# Patient Record
Sex: FEMALE | Race: WHITE | NOT HISPANIC OR LATINO | Employment: UNEMPLOYED | URBAN - METROPOLITAN AREA
[De-identification: names, ages, dates, MRNs, and addresses within clinical notes are randomized per-mention and may not be internally consistent; named-entity substitution may affect disease eponyms.]

---

## 2017-04-18 ENCOUNTER — TRANSCRIBE ORDERS (OUTPATIENT)
Dept: LAB | Facility: HOSPITAL | Age: 42
End: 2017-04-18

## 2017-04-18 ENCOUNTER — APPOINTMENT (OUTPATIENT)
Dept: LAB | Facility: HOSPITAL | Age: 42
End: 2017-04-18

## 2017-04-18 DIAGNOSIS — L57.8 NODULAR ELASTOSIS WITH CYSTS AND COMEDONES OF FAVRE AND RACOUCHOT: Primary | ICD-10-CM

## 2017-04-18 DIAGNOSIS — L70.0 NODULAR ELASTOSIS WITH CYSTS AND COMEDONES OF FAVRE AND RACOUCHOT: Primary | ICD-10-CM

## 2017-04-18 LAB
ALBUMIN SERPL-MCNC: 4.4 G/DL (ref 3.2–4.8)
ALBUMIN/GLOB SERPL: 1.4 G/DL (ref 1.5–2.5)
ALP SERPL-CCNC: 48 U/L (ref 25–100)
ALT SERPL W P-5'-P-CCNC: 17 U/L (ref 7–40)
ANION GAP SERPL CALCULATED.3IONS-SCNC: 12 MMOL/L (ref 3–11)
AST SERPL-CCNC: 26 U/L (ref 0–33)
BILIRUB SERPL-MCNC: 1 MG/DL (ref 0.3–1.2)
BUN BLD-MCNC: 13 MG/DL (ref 9–23)
BUN/CREAT SERPL: 14.4 (ref 7–25)
CALCIUM SPEC-SCNC: 9.7 MG/DL (ref 8.7–10.4)
CHLORIDE SERPL-SCNC: 104 MMOL/L (ref 99–109)
CO2 SERPL-SCNC: 23 MMOL/L (ref 20–31)
CREAT BLD-MCNC: 0.9 MG/DL (ref 0.6–1.3)
GFR SERPL CREATININE-BSD FRML MDRD: 69 ML/MIN/1.73
GLOBULIN UR ELPH-MCNC: 3.2 GM/DL
GLUCOSE BLD-MCNC: 82 MG/DL (ref 70–100)
HCG INTACT+B SERPL-ACNC: <5 MIU/ML
POTASSIUM BLD-SCNC: 4.1 MMOL/L (ref 3.5–5.5)
PROT SERPL-MCNC: 7.6 G/DL (ref 5.7–8.2)
SODIUM BLD-SCNC: 139 MMOL/L (ref 132–146)

## 2017-04-18 PROCEDURE — 84702 CHORIONIC GONADOTROPIN TEST: CPT | Performed by: PHYSICIAN ASSISTANT

## 2017-04-18 PROCEDURE — 80053 COMPREHEN METABOLIC PANEL: CPT | Performed by: PHYSICIAN ASSISTANT

## 2017-04-18 PROCEDURE — 36415 COLL VENOUS BLD VENIPUNCTURE: CPT | Performed by: PHYSICIAN ASSISTANT

## 2017-05-16 ENCOUNTER — TRANSCRIBE ORDERS (OUTPATIENT)
Dept: LAB | Facility: HOSPITAL | Age: 42
End: 2017-05-16

## 2017-05-16 ENCOUNTER — APPOINTMENT (OUTPATIENT)
Dept: LAB | Facility: HOSPITAL | Age: 42
End: 2017-05-16

## 2017-05-16 DIAGNOSIS — Z79.899 POLYPHARMACY: Primary | ICD-10-CM

## 2017-05-16 LAB — POTASSIUM BLD-SCNC: 4.2 MMOL/L (ref 3.5–5.5)

## 2017-05-16 PROCEDURE — 84132 ASSAY OF SERUM POTASSIUM: CPT | Performed by: PHYSICIAN ASSISTANT

## 2017-05-16 PROCEDURE — 36415 COLL VENOUS BLD VENIPUNCTURE: CPT | Performed by: PHYSICIAN ASSISTANT

## 2017-09-14 ENCOUNTER — APPOINTMENT (OUTPATIENT)
Dept: LAB | Facility: HOSPITAL | Age: 42
End: 2017-09-14

## 2017-09-14 ENCOUNTER — TRANSCRIBE ORDERS (OUTPATIENT)
Dept: LAB | Facility: HOSPITAL | Age: 42
End: 2017-09-14

## 2017-09-14 DIAGNOSIS — Z79.899 POLYPHARMACY: Primary | ICD-10-CM

## 2017-09-14 LAB
ALBUMIN SERPL-MCNC: 4.1 G/DL (ref 3.2–4.8)
ALBUMIN/GLOB SERPL: 1.3 G/DL (ref 1.5–2.5)
ALP SERPL-CCNC: 53 U/L (ref 25–100)
ALT SERPL W P-5'-P-CCNC: 19 U/L (ref 7–40)
ANION GAP SERPL CALCULATED.3IONS-SCNC: 4 MMOL/L (ref 3–11)
AST SERPL-CCNC: 25 U/L (ref 0–33)
BILIRUB SERPL-MCNC: 2.2 MG/DL (ref 0.3–1.2)
BUN BLD-MCNC: 11 MG/DL (ref 9–23)
BUN/CREAT SERPL: 12.2 (ref 7–25)
CALCIUM SPEC-SCNC: 8.9 MG/DL (ref 8.7–10.4)
CHLORIDE SERPL-SCNC: 104 MMOL/L (ref 99–109)
CO2 SERPL-SCNC: 30 MMOL/L (ref 20–31)
CREAT BLD-MCNC: 0.9 MG/DL (ref 0.6–1.3)
GFR SERPL CREATININE-BSD FRML MDRD: 69 ML/MIN/1.73
GLOBULIN UR ELPH-MCNC: 3.2 GM/DL
GLUCOSE BLD-MCNC: 132 MG/DL (ref 70–100)
HCG INTACT+B SERPL-ACNC: <5 MIU/ML
POTASSIUM BLD-SCNC: 4.1 MMOL/L (ref 3.5–5.5)
PROT SERPL-MCNC: 7.3 G/DL (ref 5.7–8.2)
SODIUM BLD-SCNC: 138 MMOL/L (ref 132–146)

## 2017-09-14 PROCEDURE — 80053 COMPREHEN METABOLIC PANEL: CPT | Performed by: PHYSICIAN ASSISTANT

## 2017-09-14 PROCEDURE — 84702 CHORIONIC GONADOTROPIN TEST: CPT | Performed by: PHYSICIAN ASSISTANT

## 2017-09-14 PROCEDURE — 36415 COLL VENOUS BLD VENIPUNCTURE: CPT | Performed by: PHYSICIAN ASSISTANT

## 2017-10-24 ENCOUNTER — TRANSCRIBE ORDERS (OUTPATIENT)
Dept: ADMINISTRATIVE | Facility: HOSPITAL | Age: 42
End: 2017-10-24

## 2017-10-24 DIAGNOSIS — Z12.31 VISIT FOR SCREENING MAMMOGRAM: Primary | ICD-10-CM

## 2017-11-07 ENCOUNTER — TRANSCRIBE ORDERS (OUTPATIENT)
Dept: LAB | Facility: HOSPITAL | Age: 42
End: 2017-11-07

## 2017-11-07 ENCOUNTER — LAB (OUTPATIENT)
Dept: LAB | Facility: HOSPITAL | Age: 42
End: 2017-11-07

## 2017-11-07 DIAGNOSIS — Z79.899 POLYPHARMACY: Primary | ICD-10-CM

## 2017-11-07 DIAGNOSIS — Z79.899 POLYPHARMACY: ICD-10-CM

## 2017-11-07 LAB — POTASSIUM BLD-SCNC: 4.2 MMOL/L (ref 3.5–5.5)

## 2017-11-07 PROCEDURE — 36415 COLL VENOUS BLD VENIPUNCTURE: CPT

## 2017-11-07 PROCEDURE — 84132 ASSAY OF SERUM POTASSIUM: CPT | Performed by: PHYSICIAN ASSISTANT

## 2017-11-28 ENCOUNTER — HOSPITAL ENCOUNTER (OUTPATIENT)
Dept: MAMMOGRAPHY | Facility: HOSPITAL | Age: 42
Discharge: HOME | End: 2017-11-28
Attending: OBSTETRICS & GYNECOLOGY | Admitting: OBSTETRICS & GYNECOLOGY

## 2017-11-28 DIAGNOSIS — Z12.31 VISIT FOR SCREENING MAMMOGRAM: ICD-10-CM

## 2017-11-28 PROCEDURE — G0202 SCR MAMMO BI INCL CAD: HCPCS

## 2017-11-28 PROCEDURE — 77063 BREAST TOMOSYNTHESIS BI: CPT

## 2017-11-29 PROCEDURE — 77067 SCR MAMMO BI INCL CAD: CPT | Performed by: RADIOLOGY

## 2017-11-29 PROCEDURE — 77063 BREAST TOMOSYNTHESIS BI: CPT | Performed by: RADIOLOGY

## 2018-05-17 ENCOUNTER — LAB (OUTPATIENT)
Dept: LAB | Facility: HOSPITAL | Age: 43
End: 2018-05-17

## 2018-05-17 ENCOUNTER — TRANSCRIBE ORDERS (OUTPATIENT)
Dept: LAB | Facility: HOSPITAL | Age: 43
End: 2018-05-17

## 2018-05-17 DIAGNOSIS — D48.5 NEOPLASM OF UNCERTAIN BEHAVIOR OF SKIN: ICD-10-CM

## 2018-05-17 DIAGNOSIS — L70.0 NODULAR ELASTOSIS WITH CYSTS AND COMEDONES OF FAVRE AND RACOUCHOT: ICD-10-CM

## 2018-05-17 DIAGNOSIS — D48.5 NEOPLASM OF UNCERTAIN BEHAVIOR OF SKIN: Primary | ICD-10-CM

## 2018-05-17 DIAGNOSIS — L57.8 NODULAR ELASTOSIS WITH CYSTS AND COMEDONES OF FAVRE AND RACOUCHOT: ICD-10-CM

## 2018-05-17 LAB
ALBUMIN SERPL-MCNC: 4.3 G/DL (ref 3.2–4.8)
ALBUMIN/GLOB SERPL: 1.5 G/DL (ref 1.5–2.5)
ALP SERPL-CCNC: 50 U/L (ref 25–100)
ALT SERPL W P-5'-P-CCNC: 21 U/L (ref 7–40)
ANION GAP SERPL CALCULATED.3IONS-SCNC: 7 MMOL/L (ref 3–11)
AST SERPL-CCNC: 25 U/L (ref 0–33)
BILIRUB SERPL-MCNC: 2 MG/DL (ref 0.3–1.2)
BUN BLD-MCNC: 13 MG/DL (ref 9–23)
BUN/CREAT SERPL: 14.4 (ref 7–25)
CALCIUM SPEC-SCNC: 9.1 MG/DL (ref 8.7–10.4)
CHLORIDE SERPL-SCNC: 103 MMOL/L (ref 99–109)
CO2 SERPL-SCNC: 31 MMOL/L (ref 20–31)
CREAT BLD-MCNC: 0.9 MG/DL (ref 0.6–1.3)
GFR SERPL CREATININE-BSD FRML MDRD: 69 ML/MIN/1.73
GLOBULIN UR ELPH-MCNC: 2.9 GM/DL
GLUCOSE BLD-MCNC: 80 MG/DL (ref 70–100)
POTASSIUM BLD-SCNC: 4.4 MMOL/L (ref 3.5–5.5)
PROT SERPL-MCNC: 7.2 G/DL (ref 5.7–8.2)
SODIUM BLD-SCNC: 141 MMOL/L (ref 132–146)

## 2018-05-17 PROCEDURE — 80053 COMPREHEN METABOLIC PANEL: CPT

## 2018-05-17 PROCEDURE — 36415 COLL VENOUS BLD VENIPUNCTURE: CPT

## 2018-09-20 RX ORDER — FLUOXETINE 10 MG/1
CAPSULE ORAL
Qty: 45 CAPSULE | Refills: 1 | Status: SHIPPED | OUTPATIENT
Start: 2018-09-20 | End: 2018-11-18 | Stop reason: SDUPTHER

## 2018-11-01 ENCOUNTER — OFFICE VISIT (OUTPATIENT)
Dept: INTERNAL MEDICINE | Facility: CLINIC | Age: 43
End: 2018-11-01

## 2018-11-01 VITALS
DIASTOLIC BLOOD PRESSURE: 72 MMHG | BODY MASS INDEX: 23.56 KG/M2 | SYSTOLIC BLOOD PRESSURE: 116 MMHG | HEIGHT: 64 IN | TEMPERATURE: 100.2 F | HEART RATE: 84 BPM | RESPIRATION RATE: 16 BRPM | WEIGHT: 138 LBS

## 2018-11-01 DIAGNOSIS — Z00.00 PHYSICAL EXAM: Primary | ICD-10-CM

## 2018-11-01 LAB
ALBUMIN SERPL-MCNC: 4.68 G/DL (ref 3.2–4.8)
ALBUMIN/GLOB SERPL: 1.7 G/DL (ref 1.5–2.5)
ALP SERPL-CCNC: 50 U/L (ref 25–100)
ALT SERPL W P-5'-P-CCNC: 19 U/L (ref 7–40)
ANION GAP SERPL CALCULATED.3IONS-SCNC: 5 MMOL/L (ref 3–11)
ARTICHOKE IGE QN: 121 MG/DL (ref 0–130)
AST SERPL-CCNC: 25 U/L (ref 0–33)
BASOPHILS # BLD AUTO: 0.03 10*3/MM3 (ref 0–0.2)
BASOPHILS NFR BLD AUTO: 0.4 % (ref 0–1)
BILIRUB BLD-MCNC: NEGATIVE MG/DL
BILIRUB SERPL-MCNC: 1.3 MG/DL (ref 0.3–1.2)
BUN BLD-MCNC: 11 MG/DL (ref 9–23)
BUN/CREAT SERPL: 11.5 (ref 7–25)
CALCIUM SPEC-SCNC: 9.8 MG/DL (ref 8.7–10.4)
CHLORIDE SERPL-SCNC: 106 MMOL/L (ref 99–109)
CHOLEST SERPL-MCNC: 202 MG/DL (ref 0–200)
CLARITY, POC: CLEAR
CO2 SERPL-SCNC: 29 MMOL/L (ref 20–31)
COLOR UR: YELLOW
CREAT BLD-MCNC: 0.96 MG/DL (ref 0.6–1.3)
DEPRECATED RDW RBC AUTO: 40.9 FL (ref 37–54)
EOSINOPHIL # BLD AUTO: 0.14 10*3/MM3 (ref 0–0.3)
EOSINOPHIL NFR BLD AUTO: 2 % (ref 0–3)
ERYTHROCYTE [DISTWIDTH] IN BLOOD BY AUTOMATED COUNT: 12 % (ref 11.3–14.5)
EXPIRATION DATE: ABNORMAL
GFR SERPL CREATININE-BSD FRML MDRD: 63 ML/MIN/1.73
GLOBULIN UR ELPH-MCNC: 2.7 GM/DL
GLUCOSE BLD-MCNC: 85 MG/DL (ref 70–100)
GLUCOSE UR STRIP-MCNC: NEGATIVE MG/DL
HCT VFR BLD AUTO: 41 % (ref 34.5–44)
HDLC SERPL-MCNC: 73 MG/DL (ref 40–60)
HGB BLD-MCNC: 13.8 G/DL (ref 11.5–15.5)
IMM GRANULOCYTES # BLD: 0.01 10*3/MM3 (ref 0–0.03)
IMM GRANULOCYTES NFR BLD: 0.1 % (ref 0–0.6)
KETONES UR QL: NEGATIVE
LEUKOCYTE EST, POC: NEGATIVE
LYMPHOCYTES # BLD AUTO: 2.01 10*3/MM3 (ref 0.6–4.8)
LYMPHOCYTES NFR BLD AUTO: 28.1 % (ref 24–44)
Lab: ABNORMAL
MCH RBC QN AUTO: 30.9 PG (ref 27–31)
MCHC RBC AUTO-ENTMCNC: 33.7 G/DL (ref 32–36)
MCV RBC AUTO: 91.9 FL (ref 80–99)
MONOCYTES # BLD AUTO: 0.91 10*3/MM3 (ref 0–1)
MONOCYTES NFR BLD AUTO: 12.7 % (ref 0–12)
NEUTROPHILS # BLD AUTO: 4.06 10*3/MM3 (ref 1.5–8.3)
NEUTROPHILS NFR BLD AUTO: 56.7 % (ref 41–71)
NITRITE UR-MCNC: NEGATIVE MG/ML
PH UR: 7 [PH] (ref 5–8)
PLATELET # BLD AUTO: 249 10*3/MM3 (ref 150–450)
PMV BLD AUTO: 11.1 FL (ref 6–12)
POTASSIUM BLD-SCNC: 4.2 MMOL/L (ref 3.5–5.5)
PROT SERPL-MCNC: 7.4 G/DL (ref 5.7–8.2)
PROT UR STRIP-MCNC: NEGATIVE MG/DL
RBC # BLD AUTO: 4.46 10*6/MM3 (ref 3.89–5.14)
RBC # UR STRIP: NEGATIVE /UL
SODIUM BLD-SCNC: 140 MMOL/L (ref 132–146)
SP GR UR: 1 (ref 1–1.03)
TRIGL SERPL-MCNC: 98 MG/DL (ref 0–150)
TSH SERPL DL<=0.05 MIU/L-ACNC: 1.59 MIU/ML (ref 0.35–5.35)
UROBILINOGEN UR QL: NORMAL
WBC NRBC COR # BLD: 7.16 10*3/MM3 (ref 3.5–10.8)

## 2018-11-01 PROCEDURE — 85025 COMPLETE CBC W/AUTO DIFF WBC: CPT | Performed by: INTERNAL MEDICINE

## 2018-11-01 PROCEDURE — 80061 LIPID PANEL: CPT | Performed by: INTERNAL MEDICINE

## 2018-11-01 PROCEDURE — 81003 URINALYSIS AUTO W/O SCOPE: CPT | Performed by: INTERNAL MEDICINE

## 2018-11-01 PROCEDURE — 80053 COMPREHEN METABOLIC PANEL: CPT | Performed by: INTERNAL MEDICINE

## 2018-11-01 PROCEDURE — 99386 PREV VISIT NEW AGE 40-64: CPT | Performed by: INTERNAL MEDICINE

## 2018-11-01 PROCEDURE — 84443 ASSAY THYROID STIM HORMONE: CPT | Performed by: INTERNAL MEDICINE

## 2018-11-01 PROCEDURE — 36415 COLL VENOUS BLD VENIPUNCTURE: CPT | Performed by: INTERNAL MEDICINE

## 2018-11-01 RX ORDER — ZOLPIDEM TARTRATE 5 MG/1
5 TABLET ORAL NIGHTLY PRN
COMMUNITY
End: 2019-04-16 | Stop reason: SDUPTHER

## 2018-11-01 RX ORDER — SPIRONOLACTONE 25 MG/1
75 TABLET ORAL DAILY
Refills: 5 | COMMUNITY
Start: 2018-10-17

## 2018-11-01 RX ORDER — VALACYCLOVIR HYDROCHLORIDE 1 G/1
1000 TABLET, FILM COATED ORAL 2 TIMES DAILY PRN
COMMUNITY
End: 2022-11-29 | Stop reason: SDUPTHER

## 2018-11-01 RX ORDER — CYCLOBENZAPRINE HCL 10 MG
10 TABLET ORAL EVERY 8 HOURS PRN
Refills: 2 | COMMUNITY
Start: 2018-10-17

## 2018-11-01 RX ORDER — MAGNESIUM GLUCONATE 27 MG(500)
500 TABLET ORAL DAILY
COMMUNITY

## 2018-11-01 NOTE — PROGRESS NOTES
Chief Complaint   Patient presents with   • NEW PATIENT, ESTABLISH CARE       History of Present Illness      The patient presents for an established patient physical examination and health maintenance visit.    Review of Systems    GENERAL/CONSTITUTIONAL- Denies Unexplained Weight Loss, Fever, Chills, Sweats, Fatigue, Weakness or Malaise.    HEENT- Denies Eye Pain, Eye Drainage, Nasal Discharge, Sore Throat, Ear Pain, Ear Drainage, Headache, Decreased Vision, Sinus Pain, Nasal Congestion, Decreased Hearing, Visual Disturbance, Diplopia or Tinnitus.    NECK- Denies Decreased Range of Motion, Stiffness, Thyroid Nodules, Enlarged Lymph Nodes or Goiter.    CARDIOVASCULAR- Denies Chest Pain, Claudication, Edema, Syncope or Palpitations.    PULMONARY- Denies Wheezing, Dyspnea, Sputum Production, Cough, Hemoptysis or Pleuritic Chest Pain.    GASTROINTESTINAL- Denies Abdominal Pain, Nausea, Vomiting, Diarrhea, Blood per Rectum, Constipation or Heartburn.    GENITOURINARY- Denies Dysuria, Hematuria, Urinary Frequency, Urinary Leakage, Pelvic Pain, Vaginal Prolapse, Vaginal Discharge, Dyspareunia or Abnormal Menses.    ENDOCRINE- Denies Cold Intolerance, Depression, Hair Loss, Heat Intolerance, Memory Loss, Polydypsia, Polyphagia, Polyuria, Sleep Disturbance or Weight Gain.    NEUROLOGIC- Denies Seizures, Visual Changes, Confusion or Excessive Daytime Sleepiness.    MUSCULOSKELETAL- Denies Joint Pain, Joint Stiffness, Decreased Range of Motion, Joint Swelling or Erythema of Joints.    INTEGUMENTARY- Denies Rash, Lumps, Sores, Itching, Dryness, Color Change, Changes in Hair, Brittle Nails, Discolored Nails, Thickened Nails, Growths or Alopecia.    Medications      Current Outpatient Prescriptions:   •  Cholecalciferol (VITAMIN D3) 1000 units capsule, Take 1,000 Units by mouth Daily., Disp: , Rfl:   •  cyclobenzaprine (FLEXERIL) 10 MG tablet, Take 10 mg by mouth Every 8 (Eight) Hours As Needed for Muscle Spasms., Disp: , Rfl:  "2  •  D-Mannose 500 MG capsule, Take 1 tablet by mouth Daily., Disp: , Rfl:   •  FLUoxetine (PROzac) 10 MG capsule, TAKE 1 CAPSULE ONCE DAILY FOR 14 DAYS AND 2 CAPSULES ON PREMENSES (Patient taking differently: TAKE 1 CAPSULE ONCE DAILY), Disp: 45 capsule, Rfl: 1  •  Folic Acid-Vit B6-Vit B12 (FA-VITAMIN B-6-VITAMIN B-12 PO), Take 1 tablet by mouth Daily., Disp: , Rfl:   •  magnesium gluconate (MAGONATE) 500 MG tablet, Take 500 mg by mouth Daily., Disp: , Rfl:   •  spironolactone (ALDACTONE) 25 MG tablet, Take 75 mg by mouth Daily., Disp: , Rfl: 5  •  valACYclovir (VALTREX) 1000 MG tablet, Take 1,000 mg by mouth 2 (Two) Times a Day As Needed., Disp: , Rfl:   •  zolpidem (AMBIEN) 5 MG tablet, Take 5 mg by mouth At Night As Needed for Sleep., Disp: , Rfl:      Allergies    Allergies   Allergen Reactions   • Penicillins Rash       Problem List    There is no problem list on file for this patient.      Medications, Allergies, Problems List and Past History were reviewed and updated.    Physical Examination    /72 (BP Location: Right arm, Patient Position: Sitting, Cuff Size: Adult)   Pulse 84   Temp 100.2 °F (37.9 °C) (Temporal Artery )   Resp 16   Ht 162.6 cm (64\")   Wt 62.6 kg (138 lb)   LMP  (LMP Unknown)   Breastfeeding? No   BMI 23.69 kg/m²     HEENT: Head- Normocephalic Atraumatic. Facies- Within normal limits. Pinnas- Normal texture and shape bilaterally. Canals- Normal bilaterally. TMs- Normal bilaterally. Nares- Patent bilaterally. Nasal Septum- is normal. There is no tenderness to palpation over the frontal or maxillary sinuses. Lids- Normal bilaterally. Conjunctiva- Clear bilaterally. Sclera- Anicteric bilaterally. Oropharynx- Moist with no lesions. Tonsils- No enlargement, erythema or exudate.    Neck: Thyroid- non enlarged, symmetric and has no nodules. No bruits are detected. ROM- Normal Range of Motion with no rigidity.    Lymph Nodes: Cervical- no enlarged lymph nodes noted. Clavicular- " Deferred. Axillary- Deferred. Inguinal- Deferred.    Lungs: Auscultation- Clear to auscultation bilaterally. There are no retractions, clubbing or cyanosis. The Expiratory to Inspiratory ratio is equal.    Cardiovascular: There are no carotid bruits. Heart- Normal Rate with Regular rhythm and no murmurs. There are no gallops. There are no rubs. In the lower extremities there is no edema. The upper extremities do not have edema.    Abdomen: Soft, benign, non-tender with no masses, hernias, organomegaly or scars.    Breast: Normal contours bilaterally with no masses, discharge, skin changes or lumps. There are no scars noted.    The patient has no worrisome or suspicious skin lesions noted.    Impression and Assessment    Normal Physical Examination.    Plan    Counseling was provided regarding: Adequate Aerobic Exercise, Dental Visits, Flossing Teeth and Heart Healthy Diet.    The following was ordered for screening and health maintenance: CBC w Automated Diff, CMP, Lipid Profile, TSH and U/A.       Immunizations Ordered and Administered: None.    Rosa Isela was seen today for new patient, establish care.    Diagnoses and all orders for this visit:    Physical exam  -     Comprehensive Metabolic Panel  -     Lipid Panel  -     CBC & Differential  -     TSH  -     POC Urinalysis Dipstick, Automated        Return to Office    The patient was instructed to return for follow-up in 1 year. Next Visit: Physical.    The patient was instructed to return sooner if the condition changes, worsens, or doesn't resolve.

## 2018-11-19 RX ORDER — FLUOXETINE 10 MG/1
CAPSULE ORAL
Qty: 45 CAPSULE | Refills: 5 | Status: SHIPPED | OUTPATIENT
Start: 2018-11-19 | End: 2018-12-17 | Stop reason: SDUPTHER

## 2018-12-17 RX ORDER — FLUOXETINE 10 MG/1
CAPSULE ORAL
Qty: 135 CAPSULE | Refills: 1 | Status: SHIPPED | OUTPATIENT
Start: 2018-12-17 | End: 2019-06-22 | Stop reason: SDUPTHER

## 2018-12-17 RX ORDER — FLUOXETINE 10 MG/1
CAPSULE ORAL
Qty: 135 CAPSULE | Refills: 1 | Status: SHIPPED | OUTPATIENT
Start: 2018-12-17 | End: 2018-12-17 | Stop reason: SDUPTHER

## 2019-04-11 ENCOUNTER — TRANSCRIBE ORDERS (OUTPATIENT)
Dept: ADMINISTRATIVE | Facility: HOSPITAL | Age: 44
End: 2019-04-11

## 2019-04-11 DIAGNOSIS — Z12.31 VISIT FOR SCREENING MAMMOGRAM: Primary | ICD-10-CM

## 2019-04-16 ENCOUNTER — TELEPHONE (OUTPATIENT)
Dept: INTERNAL MEDICINE | Facility: CLINIC | Age: 44
End: 2019-04-16

## 2019-04-16 RX ORDER — ZOLPIDEM TARTRATE 5 MG/1
5 TABLET ORAL NIGHTLY PRN
Qty: 30 TABLET | Refills: 2 | Status: SHIPPED | OUTPATIENT
Start: 2019-04-16 | End: 2019-11-12 | Stop reason: SDUPTHER

## 2019-04-16 NOTE — TELEPHONE ENCOUNTER
----- Message from Regina Fofana sent at 4/16/2019 11:41 AM EDT -----  [phone]  REFILL ON AMBIEN  5 MG   [facility] [address]

## 2019-06-04 ENCOUNTER — HOSPITAL ENCOUNTER (OUTPATIENT)
Dept: MAMMOGRAPHY | Facility: HOSPITAL | Age: 44
Discharge: HOME | End: 2019-06-04
Admitting: OBSTETRICS & GYNECOLOGY

## 2019-06-04 DIAGNOSIS — Z12.31 VISIT FOR SCREENING MAMMOGRAM: ICD-10-CM

## 2019-06-04 PROCEDURE — 77063 BREAST TOMOSYNTHESIS BI: CPT | Performed by: RADIOLOGY

## 2019-06-04 PROCEDURE — 77063 BREAST TOMOSYNTHESIS BI: CPT

## 2019-06-04 PROCEDURE — 77067 SCR MAMMO BI INCL CAD: CPT | Performed by: RADIOLOGY

## 2019-06-04 PROCEDURE — 77067 SCR MAMMO BI INCL CAD: CPT

## 2019-06-24 RX ORDER — FLUOXETINE 10 MG/1
CAPSULE ORAL
Qty: 135 CAPSULE | Refills: 1 | Status: SHIPPED | OUTPATIENT
Start: 2019-06-24 | End: 2020-02-11

## 2019-11-12 ENCOUNTER — OFFICE VISIT (OUTPATIENT)
Dept: INTERNAL MEDICINE | Facility: CLINIC | Age: 44
End: 2019-11-12

## 2019-11-12 VITALS
HEIGHT: 64 IN | BODY MASS INDEX: 23.73 KG/M2 | TEMPERATURE: 98 F | RESPIRATION RATE: 16 BRPM | WEIGHT: 139 LBS | HEART RATE: 76 BPM | DIASTOLIC BLOOD PRESSURE: 76 MMHG | SYSTOLIC BLOOD PRESSURE: 114 MMHG

## 2019-11-12 DIAGNOSIS — D50.9 IRON DEFICIENCY ANEMIA, UNSPECIFIED IRON DEFICIENCY ANEMIA TYPE: ICD-10-CM

## 2019-11-12 DIAGNOSIS — Z00.00 PHYSICAL EXAM: Primary | ICD-10-CM

## 2019-11-12 LAB
25(OH)D3 SERPL-MCNC: 38.2 NG/ML (ref 30–100)
ALBUMIN SERPL-MCNC: 4.7 G/DL (ref 3.5–5.2)
ALBUMIN/GLOB SERPL: 1.6 G/DL
ALP SERPL-CCNC: 51 U/L (ref 39–117)
ALT SERPL W P-5'-P-CCNC: 16 U/L (ref 1–33)
ANION GAP SERPL CALCULATED.3IONS-SCNC: 13.4 MMOL/L (ref 5–15)
AST SERPL-CCNC: 22 U/L (ref 1–32)
BASOPHILS # BLD AUTO: 0.05 10*3/MM3 (ref 0–0.2)
BASOPHILS NFR BLD AUTO: 0.9 % (ref 0–1.5)
BILIRUB BLD-MCNC: NEGATIVE MG/DL
BILIRUB SERPL-MCNC: 1.1 MG/DL (ref 0.2–1.2)
BUN BLD-MCNC: 11 MG/DL (ref 6–20)
BUN/CREAT SERPL: 11.8 (ref 7–25)
CALCIUM SPEC-SCNC: 9.5 MG/DL (ref 8.6–10.5)
CHLORIDE SERPL-SCNC: 99 MMOL/L (ref 98–107)
CHOLEST SERPL-MCNC: 188 MG/DL (ref 0–200)
CLARITY, POC: CLEAR
CO2 SERPL-SCNC: 27.6 MMOL/L (ref 22–29)
COLOR UR: YELLOW
CREAT BLD-MCNC: 0.93 MG/DL (ref 0.57–1)
DEPRECATED RDW RBC AUTO: 39.9 FL (ref 37–54)
EOSINOPHIL # BLD AUTO: 0.18 10*3/MM3 (ref 0–0.4)
EOSINOPHIL NFR BLD AUTO: 3.3 % (ref 0.3–6.2)
ERYTHROCYTE [DISTWIDTH] IN BLOOD BY AUTOMATED COUNT: 12 % (ref 12.3–15.4)
EXPIRATION DATE: NORMAL
FERRITIN SERPL-MCNC: 65.6 NG/ML (ref 13–150)
GFR SERPL CREATININE-BSD FRML MDRD: 65 ML/MIN/1.73
GLOBULIN UR ELPH-MCNC: 3 GM/DL
GLUCOSE BLD-MCNC: 88 MG/DL (ref 65–99)
GLUCOSE UR STRIP-MCNC: NEGATIVE MG/DL
HCT VFR BLD AUTO: 38.5 % (ref 34–46.6)
HDLC SERPL-MCNC: 66 MG/DL (ref 40–60)
HGB BLD-MCNC: 13.4 G/DL (ref 12–15.9)
IMM GRANULOCYTES # BLD AUTO: 0.02 10*3/MM3 (ref 0–0.05)
IMM GRANULOCYTES NFR BLD AUTO: 0.4 % (ref 0–0.5)
IRON 24H UR-MRATE: 97 MCG/DL (ref 37–145)
IRON SATN MFR SERPL: 22 % (ref 20–50)
KETONES UR QL: NEGATIVE
LDLC SERPL CALC-MCNC: 110 MG/DL (ref 0–100)
LDLC/HDLC SERPL: 1.66 {RATIO}
LEUKOCYTE EST, POC: NEGATIVE
LYMPHOCYTES # BLD AUTO: 1.65 10*3/MM3 (ref 0.7–3.1)
LYMPHOCYTES NFR BLD AUTO: 30.4 % (ref 19.6–45.3)
Lab: NORMAL
MCH RBC QN AUTO: 32 PG (ref 26.6–33)
MCHC RBC AUTO-ENTMCNC: 34.8 G/DL (ref 31.5–35.7)
MCV RBC AUTO: 91.9 FL (ref 79–97)
MONOCYTES # BLD AUTO: 0.71 10*3/MM3 (ref 0.1–0.9)
MONOCYTES NFR BLD AUTO: 13.1 % (ref 5–12)
NEUTROPHILS # BLD AUTO: 2.81 10*3/MM3 (ref 1.7–7)
NEUTROPHILS NFR BLD AUTO: 51.9 % (ref 42.7–76)
NITRITE UR-MCNC: NEGATIVE MG/ML
NRBC BLD AUTO-RTO: 0 /100 WBC (ref 0–0.2)
PH UR: 7 [PH] (ref 5–8)
PLATELET # BLD AUTO: 206 10*3/MM3 (ref 140–450)
PMV BLD AUTO: 11.4 FL (ref 6–12)
POTASSIUM BLD-SCNC: 4.4 MMOL/L (ref 3.5–5.2)
PROT SERPL-MCNC: 7.7 G/DL (ref 6–8.5)
PROT UR STRIP-MCNC: NEGATIVE MG/DL
RBC # BLD AUTO: 4.19 10*6/MM3 (ref 3.77–5.28)
RBC # UR STRIP: NEGATIVE /UL
SODIUM BLD-SCNC: 140 MMOL/L (ref 136–145)
SP GR UR: 1 (ref 1–1.03)
TIBC SERPL-MCNC: 443 MCG/DL (ref 298–536)
TRANSFERRIN SERPL-MCNC: 297 MG/DL (ref 200–360)
TRIGL SERPL-MCNC: 62 MG/DL (ref 0–150)
TSH SERPL DL<=0.05 MIU/L-ACNC: 1.42 UIU/ML (ref 0.27–4.2)
UROBILINOGEN UR QL: NORMAL
VLDLC SERPL-MCNC: 12.4 MG/DL (ref 5–40)
WBC NRBC COR # BLD: 5.42 10*3/MM3 (ref 3.4–10.8)

## 2019-11-12 PROCEDURE — 99396 PREV VISIT EST AGE 40-64: CPT | Performed by: INTERNAL MEDICINE

## 2019-11-12 PROCEDURE — 80061 LIPID PANEL: CPT | Performed by: INTERNAL MEDICINE

## 2019-11-12 PROCEDURE — 82306 VITAMIN D 25 HYDROXY: CPT | Performed by: INTERNAL MEDICINE

## 2019-11-12 PROCEDURE — 81003 URINALYSIS AUTO W/O SCOPE: CPT | Performed by: INTERNAL MEDICINE

## 2019-11-12 PROCEDURE — 82728 ASSAY OF FERRITIN: CPT | Performed by: INTERNAL MEDICINE

## 2019-11-12 PROCEDURE — 85025 COMPLETE CBC W/AUTO DIFF WBC: CPT | Performed by: INTERNAL MEDICINE

## 2019-11-12 PROCEDURE — 84466 ASSAY OF TRANSFERRIN: CPT | Performed by: INTERNAL MEDICINE

## 2019-11-12 PROCEDURE — 83540 ASSAY OF IRON: CPT | Performed by: INTERNAL MEDICINE

## 2019-11-12 PROCEDURE — 84443 ASSAY THYROID STIM HORMONE: CPT | Performed by: INTERNAL MEDICINE

## 2019-11-12 PROCEDURE — 36415 COLL VENOUS BLD VENIPUNCTURE: CPT | Performed by: INTERNAL MEDICINE

## 2019-11-12 PROCEDURE — 80053 COMPREHEN METABOLIC PANEL: CPT | Performed by: INTERNAL MEDICINE

## 2019-11-12 RX ORDER — ZOLPIDEM TARTRATE 5 MG/1
5 TABLET ORAL NIGHTLY PRN
Qty: 30 TABLET | Refills: 2 | Status: SHIPPED | OUTPATIENT
Start: 2019-11-12 | End: 2020-05-28

## 2019-11-12 RX ORDER — NITROFURANTOIN MACROCRYSTALS 100 MG/1
100 CAPSULE ORAL DAILY PRN
COMMUNITY
End: 2021-06-24

## 2019-11-12 NOTE — PROGRESS NOTES
Chief Complaint   Patient presents with   • Annual Exam       History of Present Illness    The patient presents for an established patient physical examination and health maintenance visit.    The patient presents for a follow-up related to anemia. There are no reports of blood loss. The patient has no symptoms of a dry cough, a wet cough, wheezing, fever, a headache, nausea, vomiting, diarrhea, myalgias, abdominal pain, sweats, weight loss, decreased appetite, chills, fatigue or paresthesias. The patient's energy level is normal. There are no reports of chest pain, shortness of breath, palpitations or syncope.    Review of Systems    CONSTITUTIONAL- Denies Weakness or Malaise.    NECK- Denies Decreased Range of Motion, Stiffness, Thyroid Nodules, Enlarged Lymph Nodes or Goiter.    EYES- Denies Eye Pain, Eye Drainage, Eye Redness, Eye Discharge, Decreased Vision, Visual Disturbance, Diplopia, Visual Loss or Swollen Eyelid.    HENT- Denies Nasal Discharge, Sore Throat, Ear Pain, Ear Drainage, Sinus Pain, Nasal Congestion, Decreased Hearing or Tinnitus.    PULMONARY- Denies Sputum Production, Cough, Hemoptysis or Pleuritic Chest Pain.    GASTROINTESTINAL- Denies Blood per Rectum, Constipation or Heartburn.    CARDIOVASCULAR- Denies Claudication, Edema or Irregular Heart Beat.    GENITOURINARY- Denies Dysuria, Hematuria, Urinary Frequency, Urinary Leakage, Pelvic Pain, Vaginal Prolapse, Vaginal Discharge, Dyspareunia or Abnormal Menses.    ENDOCRINE- Denies Cold Intolerance, Depression, Hair Loss, Heat Intolerance, Memory Loss, Polydypsia, Polyphagia, Polyuria, Sleep Disturbance or Weight Gain.    NEUROLOGIC- Denies Seizures, Visual Changes, Confusion or Excessive Daytime Sleepiness.    MUSCULOSKELETAL- Denies Joint Pain, Joint Stiffness, Decreased Range of Motion, Joint Swelling or Erythema of Joints.    INTEGUMENTARY- Denies Rash, Lumps, Sores, Itching, Dryness, Color Change, Changes in Hair, Brittle Nails,  "Discolored Nails, Thickened Nails, Growths or Alopecia.    Medications      Current Outpatient Medications:   •  Cholecalciferol (VITAMIN D3) 1000 units capsule, Take 1,000 Units by mouth Daily., Disp: , Rfl:   •  cyclobenzaprine (FLEXERIL) 10 MG tablet, Take 10 mg by mouth Every 8 (Eight) Hours As Needed for Muscle Spasms., Disp: , Rfl: 2  •  D-Mannose 500 MG capsule, Take 1 tablet by mouth Daily., Disp: , Rfl:   •  FLUoxetine (PROzac) 10 MG capsule, TAKE 1 CAPSULE ONCE DAILY FOR 14 DAYS AND 2 CAPSULES ON PREMENSES., Disp: 135 capsule, Rfl: 1  •  Folic Acid-Vit B6-Vit B12 (FA-VITAMIN B-6-VITAMIN B-12 PO), Take 1 tablet by mouth Daily., Disp: , Rfl:   •  magnesium gluconate (MAGONATE) 500 MG tablet, Take 500 mg by mouth Daily., Disp: , Rfl:   •  nitrofurantoin (MACRODANTIN) 100 MG capsule, Take 100 mg by mouth Daily As Needed., Disp: , Rfl:   •  spironolactone (ALDACTONE) 25 MG tablet, Take 75 mg by mouth Daily., Disp: , Rfl: 5  •  valACYclovir (VALTREX) 1000 MG tablet, Take 1,000 mg by mouth 2 (Two) Times a Day As Needed., Disp: , Rfl:   •  zolpidem (AMBIEN) 5 MG tablet, Take 1 tablet by mouth At Night As Needed for Sleep., Disp: 30 tablet, Rfl: 2     Allergies    Allergies   Allergen Reactions   • Penicillins Rash       Problem List    There is no problem list on file for this patient.      Medications, Allergies, Problems List and Past History were reviewed and updated.    Physical Examination    /76 (BP Location: Left arm, Patient Position: Sitting, Cuff Size: Adult)   Pulse 76   Temp 98 °F (36.7 °C) (Temporal)   Resp 16   Ht 163.2 cm (64.25\")   Wt 63 kg (139 lb)   LMP  (LMP Unknown) Comment: Last pap smear 6/2018 by Dr Garza  Breastfeeding? No   BMI 23.67 kg/m²     HEENT: Head- Normocephalic Atraumatic. Facies- Within normal limits. Pinnas- Normal texture and shape bilaterally. Canals- Normal bilaterally. TMs- Normal bilaterally. Nares- Patent bilaterally. Nasal Septum- is normal. There is no " tenderness to palpation over the frontal or maxillary sinuses. Lids- Normal bilaterally. Conjunctiva- Clear bilaterally. Sclera- Anicteric bilaterally. Oropharynx- Moist with no lesions. Tonsils- No enlargement, erythema or exudate.    Neck: Thyroid- non enlarged, symmetric and has no nodules. No bruits are detected. ROM- Normal Range of Motion with no rigidity.    Lungs: Auscultation- Clear to auscultation bilaterally. There are no retractions, clubbing or cyanosis. The Expiratory to Inspiratory ratio is equal.    Lymph Nodes: Cervical- no enlarged lymph nodes noted. Clavicular- Deferred. Axillary- Deferred. Inguinal- Deferred.    Cardiovascular: There are no carotid bruits. Heart- Normal Rate with Regular rhythm and no murmurs. There are no gallops. There are no rubs. In the lower extremities there is no edema. The upper extremities do not have edema.    Abdomen: Soft, benign, non-tender with no masses, hernias, organomegaly or scars.    Breast: Normal contours bilaterally with no masses, discharge, skin changes or lumps. There are no scars noted.    Dermatologic: The patient has no worrisome or suspicious skin lesions noted.    Impression and Assessment    Normal Physical Examination.    Iron Deficiency Anemia.    Plan    Iron Deficiency Anemia Plan: The condition is stable. No change is needed in the current plan.    Counseling was provided regarding: Adequate Aerobic Exercise, Dental Visits, Flossing Teeth and Heart Healthy Diet.    The following was ordered for screening and health maintenance: CMP, Lipid Profile, TSH and U/A.       Immunizations Ordered and Administered: None.    Rosa Isela was seen today for annual exam.    Diagnoses and all orders for this visit:    Physical exam  -     Comprehensive Metabolic Panel  -     Lipid Panel  -     TSH  -     Vitamin D 25 Hydroxy  -     POC Urinalysis Dipstick, Automated    Iron deficiency anemia, unspecified iron deficiency anemia type  -     CBC & Differential  -      Iron Profile  -     Ferritin    Other orders  -     zolpidem (AMBIEN) 5 MG tablet; Take 1 tablet by mouth At Night As Needed for Sleep.          Return to Office    The patient was instructed to return for follow-up in 1 year. Next Visit: Physical.    The patient was instructed to return sooner if the condition changes, worsens, or does not resolve.

## 2019-12-20 DIAGNOSIS — Z20.828 EXPOSURE TO THE FLU: Primary | ICD-10-CM

## 2019-12-20 RX ORDER — OSELTAMIVIR PHOSPHATE 75 MG/1
75 CAPSULE ORAL DAILY
Qty: 10 CAPSULE | Refills: 0 | Status: SHIPPED | OUTPATIENT
Start: 2019-12-20 | End: 2019-12-30

## 2020-02-11 RX ORDER — FLUOXETINE 10 MG/1
CAPSULE ORAL
Qty: 135 CAPSULE | Refills: 1 | Status: SHIPPED | OUTPATIENT
Start: 2020-02-11 | End: 2021-06-29 | Stop reason: SDUPTHER

## 2020-05-28 RX ORDER — ZOLPIDEM TARTRATE 5 MG/1
5 TABLET ORAL NIGHTLY PRN
Qty: 30 TABLET | Refills: 2 | Status: SHIPPED | OUTPATIENT
Start: 2020-05-28 | End: 2021-02-11

## 2020-07-22 ENCOUNTER — TRANSCRIBE ORDERS (OUTPATIENT)
Dept: ADMINISTRATIVE | Facility: HOSPITAL | Age: 45
End: 2020-07-22

## 2020-07-22 DIAGNOSIS — Z12.31 VISIT FOR SCREENING MAMMOGRAM: Primary | ICD-10-CM

## 2020-08-03 ENCOUNTER — TELEPHONE (OUTPATIENT)
Dept: INTERNAL MEDICINE | Facility: CLINIC | Age: 45
End: 2020-08-03

## 2020-08-03 DIAGNOSIS — Z11.1 SCREENING-PULMONARY TB: Primary | ICD-10-CM

## 2020-08-04 ENCOUNTER — CLINICAL SUPPORT (OUTPATIENT)
Dept: INTERNAL MEDICINE | Facility: CLINIC | Age: 45
End: 2020-08-04

## 2020-08-04 DIAGNOSIS — Z11.1 SCREENING-PULMONARY TB: ICD-10-CM

## 2020-08-04 PROCEDURE — 86580 TB INTRADERMAL TEST: CPT | Performed by: INTERNAL MEDICINE

## 2020-08-07 LAB
INDURATION: 0 MM (ref 0–10)
Lab: NORMAL
Lab: NORMAL
TB SKIN TEST: NEGATIVE

## 2020-11-12 ENCOUNTER — HOSPITAL ENCOUNTER (OUTPATIENT)
Dept: MAMMOGRAPHY | Facility: HOSPITAL | Age: 45
Discharge: HOME | End: 2020-11-12
Admitting: OBSTETRICS & GYNECOLOGY

## 2020-11-12 DIAGNOSIS — Z12.31 VISIT FOR SCREENING MAMMOGRAM: ICD-10-CM

## 2020-11-12 PROCEDURE — 77067 SCR MAMMO BI INCL CAD: CPT

## 2020-11-12 PROCEDURE — 77067 SCR MAMMO BI INCL CAD: CPT | Performed by: RADIOLOGY

## 2020-11-12 PROCEDURE — 77063 BREAST TOMOSYNTHESIS BI: CPT

## 2020-11-12 PROCEDURE — 77063 BREAST TOMOSYNTHESIS BI: CPT | Performed by: RADIOLOGY

## 2020-11-19 ENCOUNTER — OFFICE VISIT (OUTPATIENT)
Dept: OBSTETRICS AND GYNECOLOGY | Facility: CLINIC | Age: 45
End: 2020-11-19

## 2020-11-19 VITALS
WEIGHT: 148 LBS | SYSTOLIC BLOOD PRESSURE: 122 MMHG | DIASTOLIC BLOOD PRESSURE: 80 MMHG | HEIGHT: 64 IN | BODY MASS INDEX: 25.27 KG/M2

## 2020-11-19 DIAGNOSIS — Z98.890 STATUS POST ENDOMETRIAL ABLATION: ICD-10-CM

## 2020-11-19 DIAGNOSIS — Z12.11 COLON CANCER SCREENING: ICD-10-CM

## 2020-11-19 DIAGNOSIS — Z12.39 ENCOUNTER FOR BREAST CANCER SCREENING USING NON-MAMMOGRAM MODALITY: ICD-10-CM

## 2020-11-19 DIAGNOSIS — Z01.419 WOMEN'S ANNUAL ROUTINE GYNECOLOGICAL EXAMINATION: Primary | ICD-10-CM

## 2020-11-19 PROCEDURE — 99396 PREV VISIT EST AGE 40-64: CPT | Performed by: OBSTETRICS & GYNECOLOGY

## 2020-11-19 NOTE — PROGRESS NOTES
GYN Annual Exam     CC - Here for annual exam.     Subjective   HPI  Rosa Isela Nolan is a 45 y.o. female, , who presents for annual well woman exam. No LMP recorded. Patient has had an ablation..  Periods are absent with Ablation. She has minimal spotting. Dysmenorrhea:mild, occurring premenstrually.  Patient reports problems with: none.  Partner Status: Marital Status: .  New Partners since last visit: no.  Desires STD Screening: no.  Patient has not had the HPV vaccine.     Additional OB/GYN History     Current contraception: contraceptive methods: Tubal ligation  Desires to: continue contraception  Last Pap :   Last Completed Pap Smear       Status Date      PAP SMEAR Patient-Reported (Performed Externally) 2017 Dr. Garza. Date approximate     Patient has more history with this topic...        History of abnormal Pap smear: no  Family history of uterine, colon, breast, or ovarian cancer: yes - breast CA-PGM  Previous Mammogram :  yes - 2020-benign  Performs monthly Self-Breast Exam: no  Exercises Regularly:yes  Feelings of Anxiety or Depression: no  Tobacco Usage?: No   OB History        4    Para   4    Term   4            AB        Living   4       SAB        TAB        Ectopic        Molar        Multiple        Live Births   4                Health Maintenance   Topic Date Due   • Annual Gynecologic Pelvic and Breast Exam  1975   • COLONOSCOPY  1975   • HEPATITIS C SCREENING  2018   • INFLUENZA VACCINE  2020   • PAP SMEAR  2020   • ANNUAL PHYSICAL  2020   • TDAP/TD VACCINES (2 - Td) 2023   • Pneumococcal Vaccine 0-64  Aged Out       The additional following portions of the patient's history were reviewed and updated as appropriate: allergies, current medications, past family history, past medical history, past social history, past surgical history and problem list.    Review of Systems   Constitutional: Negative.    HENT: Negative.   "  Eyes: Negative.    Respiratory: Negative.    Cardiovascular: Negative.    Gastrointestinal: Negative.    Endocrine: Negative.    Genitourinary: Negative.    Musculoskeletal: Negative.    Skin: Negative.    Allergic/Immunologic: Negative.    Neurological: Negative.    Hematological: Negative.    Psychiatric/Behavioral: Negative.        I have reviewed and agree with the HPI, ROS, and historical information as entered above. Breana Garza MD    Objective   /80   Ht 162.6 cm (64\")   Wt 67.1 kg (148 lb)   BMI 25.40 kg/m²     Physical Exam  Vitals signs and nursing note reviewed. Exam conducted with a chaperone present.   Constitutional:       Appearance: She is well-developed.   HENT:      Head: Normocephalic and atraumatic.   Neck:      Musculoskeletal: Normal range of motion. No muscular tenderness.      Thyroid: No thyroid mass or thyromegaly.   Cardiovascular:      Rate and Rhythm: Normal rate and regular rhythm.      Heart sounds: No murmur.   Pulmonary:      Effort: Pulmonary effort is normal. No retractions.      Breath sounds: Normal breath sounds. No wheezing, rhonchi or rales.   Chest:      Chest wall: No mass or tenderness.      Breasts:         Right: Normal. No mass, nipple discharge, skin change or tenderness.         Left: Normal. No mass, nipple discharge, skin change or tenderness.   Abdominal:      General: Bowel sounds are normal.      Palpations: Abdomen is soft. Abdomen is not rigid. There is no mass.      Tenderness: There is no abdominal tenderness. There is no guarding.      Hernia: No hernia is present. There is no hernia in the left inguinal area.   Genitourinary:     Labia:         Right: No rash, tenderness or lesion.         Left: No rash, tenderness or lesion.       Vagina: Normal. No vaginal discharge or lesions.      Cervix: No cervical motion tenderness, discharge, lesion or cervical bleeding.      Uterus: Normal. Not enlarged, not fixed and not tender.       Adnexa:         " Right: No mass or tenderness.          Left: No mass or tenderness.        Rectum: No external hemorrhoid.   Neurological:      Mental Status: She is alert and oriented to person, place, and time.   Psychiatric:         Behavior: Behavior normal.         Assessment/Plan       Problem List Items Addressed This Visit        Other    Status post endometrial ablation      Other Visit Diagnoses     Women's annual routine gynecological examination    -  Primary    Relevant Orders    Pap IG, HPV-hr    Encounter for breast cancer screening using non-mammogram modality        Colon cancer screening        Relevant Orders    Cologuard - Stool, Per Rectum          Plan     1. Recommended use of Vitamin D replacement and getting adequate calcium in her diet. (1500mg)  2. Reviewed monthly self breast exams.  Instructed to call with lumps, pain, or breast discharge.    3. Continue yearly mammography  4. Reviewed HPV guidelines.  Encouraged HPV vaccine  5. Reviewed exercise as a preventative health measures.   6. Happy with ablation  7. Reviewed colonoscopy recommendations to start routine colonoscopy at age 45.  She has option of Cologuard if no family history of colon cancer or precancerous polyps.  She prefers to go ahead with Cologuard.        Breana Garza MD  11/19/2020

## 2020-11-24 ENCOUNTER — RESULTS ENCOUNTER (OUTPATIENT)
Dept: OBSTETRICS AND GYNECOLOGY | Facility: CLINIC | Age: 45
End: 2020-11-24

## 2020-11-24 DIAGNOSIS — Z12.11 COLON CANCER SCREENING: ICD-10-CM

## 2020-11-25 DIAGNOSIS — Z01.419 WOMEN'S ANNUAL ROUTINE GYNECOLOGICAL EXAMINATION: ICD-10-CM

## 2021-02-11 RX ORDER — ZOLPIDEM TARTRATE 5 MG/1
5 TABLET ORAL NIGHTLY PRN
Qty: 30 TABLET | Refills: 2 | Status: SHIPPED | OUTPATIENT
Start: 2021-02-11 | End: 2022-01-05

## 2021-04-19 ENCOUNTER — LAB (OUTPATIENT)
Dept: LAB | Facility: HOSPITAL | Age: 46
End: 2021-04-19

## 2021-04-19 ENCOUNTER — TRANSCRIBE ORDERS (OUTPATIENT)
Dept: LAB | Facility: HOSPITAL | Age: 46
End: 2021-04-19

## 2021-04-19 DIAGNOSIS — Z87.2: ICD-10-CM

## 2021-04-19 DIAGNOSIS — D18.01 HEMANGIOMA OF SKIN AND SUBCUTANEOUS TISSUE: ICD-10-CM

## 2021-04-19 DIAGNOSIS — L70.0 NODULAR ELASTOSIS WITH CYSTS AND COMEDONES OF FAVRE AND RACOUCHOT: ICD-10-CM

## 2021-04-19 DIAGNOSIS — D23.72 DERMATOFIBROMA OF LEFT LOWER LEG: ICD-10-CM

## 2021-04-19 DIAGNOSIS — Z71.89 OTHER PARENT-CHILD PROBLEMS: ICD-10-CM

## 2021-04-19 DIAGNOSIS — L57.8 NODULAR ELASTOSIS WITH CYSTS AND COMEDONES OF FAVRE AND RACOUCHOT: ICD-10-CM

## 2021-04-19 DIAGNOSIS — D48.5 NEOPLASM OF UNCERTAIN BEHAVIOR OF SKIN: ICD-10-CM

## 2021-04-19 DIAGNOSIS — D48.5 NEOPLASM OF UNCERTAIN BEHAVIOR OF SKIN: Primary | ICD-10-CM

## 2021-04-19 DIAGNOSIS — D22.5 PIGMENTED HAIRY EPIDERMAL NEVUS: ICD-10-CM

## 2021-04-19 DIAGNOSIS — Z62.820 OTHER PARENT-CHILD PROBLEMS: ICD-10-CM

## 2021-04-19 LAB
ALBUMIN SERPL-MCNC: 4.3 G/DL (ref 3.5–5.2)
ALBUMIN/GLOB SERPL: 1.7 G/DL
ALP SERPL-CCNC: 49 U/L (ref 39–117)
ALT SERPL W P-5'-P-CCNC: 16 U/L (ref 1–33)
ANION GAP SERPL CALCULATED.3IONS-SCNC: 7.4 MMOL/L (ref 5–15)
AST SERPL-CCNC: 20 U/L (ref 1–32)
BILIRUB SERPL-MCNC: 0.9 MG/DL (ref 0–1.2)
BUN SERPL-MCNC: 11 MG/DL (ref 6–20)
BUN/CREAT SERPL: 14.1 (ref 7–25)
CALCIUM SPEC-SCNC: 9.5 MG/DL (ref 8.6–10.5)
CHLORIDE SERPL-SCNC: 101 MMOL/L (ref 98–107)
CO2 SERPL-SCNC: 30.6 MMOL/L (ref 22–29)
CREAT SERPL-MCNC: 0.78 MG/DL (ref 0.57–1)
GFR SERPL CREATININE-BSD FRML MDRD: 80 ML/MIN/1.73
GLOBULIN UR ELPH-MCNC: 2.6 GM/DL
GLUCOSE SERPL-MCNC: 88 MG/DL (ref 65–99)
POTASSIUM SERPL-SCNC: 4 MMOL/L (ref 3.5–5.2)
PROT SERPL-MCNC: 6.9 G/DL (ref 6–8.5)
SODIUM SERPL-SCNC: 139 MMOL/L (ref 136–145)

## 2021-04-19 PROCEDURE — 36415 COLL VENOUS BLD VENIPUNCTURE: CPT

## 2021-04-19 PROCEDURE — 80053 COMPREHEN METABOLIC PANEL: CPT

## 2021-06-24 ENCOUNTER — OFFICE VISIT (OUTPATIENT)
Dept: INTERNAL MEDICINE | Facility: CLINIC | Age: 46
End: 2021-06-24

## 2021-06-24 VITALS
DIASTOLIC BLOOD PRESSURE: 70 MMHG | TEMPERATURE: 98 F | SYSTOLIC BLOOD PRESSURE: 104 MMHG | BODY MASS INDEX: 24.59 KG/M2 | HEART RATE: 72 BPM | RESPIRATION RATE: 14 BRPM | WEIGHT: 144 LBS | HEIGHT: 64 IN

## 2021-06-24 DIAGNOSIS — Z12.11 SCREENING FOR COLON CANCER: ICD-10-CM

## 2021-06-24 DIAGNOSIS — Z00.00 PHYSICAL EXAM: Primary | ICD-10-CM

## 2021-06-24 LAB
BILIRUB BLD-MCNC: NEGATIVE MG/DL
CLARITY, POC: CLEAR
COLOR UR: YELLOW
EXPIRATION DATE: NORMAL
GLUCOSE UR STRIP-MCNC: NEGATIVE MG/DL
KETONES UR QL: NEGATIVE
LEUKOCYTE EST, POC: NEGATIVE
Lab: NORMAL
NITRITE UR-MCNC: NEGATIVE MG/ML
PH UR: 8 [PH] (ref 5–8)
PROT UR STRIP-MCNC: NEGATIVE MG/DL
RBC # UR STRIP: NEGATIVE /UL
SP GR UR: 1.01 (ref 1–1.03)
UROBILINOGEN UR QL: NORMAL

## 2021-06-24 PROCEDURE — 80061 LIPID PANEL: CPT | Performed by: INTERNAL MEDICINE

## 2021-06-24 PROCEDURE — 84443 ASSAY THYROID STIM HORMONE: CPT | Performed by: INTERNAL MEDICINE

## 2021-06-24 PROCEDURE — 85025 COMPLETE CBC W/AUTO DIFF WBC: CPT | Performed by: INTERNAL MEDICINE

## 2021-06-24 PROCEDURE — 99396 PREV VISIT EST AGE 40-64: CPT | Performed by: INTERNAL MEDICINE

## 2021-06-24 PROCEDURE — 82306 VITAMIN D 25 HYDROXY: CPT | Performed by: INTERNAL MEDICINE

## 2021-06-24 PROCEDURE — 36415 COLL VENOUS BLD VENIPUNCTURE: CPT | Performed by: INTERNAL MEDICINE

## 2021-06-24 PROCEDURE — 80053 COMPREHEN METABOLIC PANEL: CPT | Performed by: INTERNAL MEDICINE

## 2021-06-24 PROCEDURE — 81003 URINALYSIS AUTO W/O SCOPE: CPT | Performed by: INTERNAL MEDICINE

## 2021-06-24 PROCEDURE — 86803 HEPATITIS C AB TEST: CPT | Performed by: INTERNAL MEDICINE

## 2021-06-24 NOTE — PROGRESS NOTES
Chief Complaint   Patient presents with   • Annual Exam       History of Present Illness    The patient presents for an established patient physical examination and health maintenance visit.    Review of Systems    CONSTITUTIONAL- Denies Unexplained Weight Loss, Fever, Chills, Sweats, Fatigue, Weakness or Malaise.    NECK- Denies Decreased Range of Motion, Stiffness, Thyroid Nodules, Enlarged Lymph Nodes or Goiter.    EYES- Denies Eye Pain, Eye Drainage, Eye Redness, Eye Discharge, Decreased Vision, Visual Disturbance, Diplopia, Visual Loss or Swollen Eyelid.    HENT- Denies Nasal Discharge, Sore Throat, Ear Pain, Ear Drainage, Headache, Sinus Pain, Nasal Congestion, Decreased Hearing or Tinnitus.    PULMONARY- Denies Wheezing, Dyspnea, Sputum Production, Cough, Hemoptysis or Pleuritic Chest Pain.    GASTROINTESTINAL- Denies Abdominal Pain, Nausea, Vomiting, Diarrhea, Blood per Rectum, Constipation or Heartburn.    CARDIOVASCULAR- Denies Chest Pain, Claudication, Edema, Syncope, Palpitations or Irregular Heart Beat.    GENITOURINARY- Denies Dysuria, Hematuria, Urinary Frequency, Urinary Leakage, Pelvic Pain, Vaginal Prolapse, Vaginal Discharge, Dyspareunia or Abnormal Menses.    ENDOCRINE- Denies Cold Intolerance, Depression, Hair Loss, Heat Intolerance, Memory Loss, Polydypsia, Polyphagia, Polyuria, Sleep Disturbance or Weight Gain.    NEUROLOGIC- Denies Seizures, Visual Changes, Confusion or Excessive Daytime Sleepiness.    MUSCULOSKELETAL- Denies Joint Pain, Joint Stiffness, Decreased Range of Motion, Joint Swelling or Erythema of Joints.    INTEGUMENTARY- Denies Rash, Lumps, Sores, Itching, Dryness, Color Change, Changes in Hair, Brittle Nails, Discolored Nails, Thickened Nails, Growths or Alopecia.    Medications      Current Outpatient Medications:   •  Cholecalciferol (VITAMIN D3) 1000 units capsule, Take 1,000 Units by mouth Daily., Disp: , Rfl:   •  cyclobenzaprine (FLEXERIL) 10 MG tablet, Take 10 mg by  "mouth Every 8 (Eight) Hours As Needed for Muscle Spasms., Disp: , Rfl: 2  •  FLUoxetine (PROzac) 10 MG capsule, TAKE 1 CAPSULE ONCE DAILY FOR 14 DAYS AND 2 CAPSULES ON PREMENSES., Disp: 135 capsule, Rfl: 1  •  Folic Acid-Vit B6-Vit B12 (FA-VITAMIN B-6-VITAMIN B-12 PO), Take 1 tablet by mouth Daily., Disp: , Rfl:   •  magnesium gluconate (MAGONATE) 500 MG tablet, Take 500 mg by mouth Daily., Disp: , Rfl:   •  spironolactone (ALDACTONE) 25 MG tablet, Take 75 mg by mouth Daily., Disp: , Rfl: 5  •  zolpidem (AMBIEN) 5 MG tablet, TAKE 1 TABLET BY MOUTH AT NIGHT AS NEEDED FOR SLEEP., Disp: 30 tablet, Rfl: 2  •  valACYclovir (VALTREX) 1000 MG tablet, Take 1,000 mg by mouth 2 (Two) Times a Day As Needed., Disp: , Rfl:      Allergies    Allergies   Allergen Reactions   • Penicillins Rash       Problem List    Patient Active Problem List   Diagnosis   • Status post endometrial ablation       Medications, Allergies, Problems List and Past History were reviewed and updated.    Physical Examination    /70 (BP Location: Left arm, Patient Position: Sitting, Cuff Size: Adult)   Pulse 72   Temp 98 °F (36.7 °C) (Infrared)   Resp 14   Ht 162.6 cm (64\")   Wt 65.3 kg (144 lb)   LMP  (LMP Unknown) Comment: LAST PAP  BY DR PRIETO STAYS CURRENT   Breastfeeding No   BMI 24.72 kg/m²     HEENT: Head- Normocephalic Atraumatic. Facies- Within normal limits. Pinnas- Normal texture and shape bilaterally. Canals- Normal bilaterally. TMs- Normal bilaterally. Nares- Patent bilaterally. Nasal Septum- is normal. There is no tenderness to palpation over the frontal or maxillary sinuses. Lids- Normal bilaterally. Conjunctiva- Clear bilaterally. Sclera- Anicteric bilaterally. Oropharynx- Moist with no lesions. Tonsils- No enlargement, erythema or exudate.    Neck: Thyroid- non enlarged, symmetric and has no nodules. No bruits are detected. ROM- Normal Range of Motion with no rigidity.    Lungs: Auscultation- Clear to auscultation " bilaterally. There are no retractions, clubbing or cyanosis. The Expiratory to Inspiratory ratio is equal.    Lymph Nodes: Cervical- no enlarged lymph nodes noted. Clavicular- no enlarged supraclavicular lymph nodes noted. Axillary- no enlarged axillary lymph nodes noted. Inguinal- no enlarged inguinal lymph nodes noted.    Cardiovascular: There are no carotid bruits. Heart- Normal Rate with Regular rhythm and no murmurs. There are no gallops. There are no rubs. In the lower extremities there is no edema. The upper extremities do not have edema.    Abdomen: Soft, benign, non-tender with no masses, hernias, organomegaly or scars.    Breast: Normal contours bilaterally with no masses, discharge, skin changes or lumps. There are no scars noted.    Dermatologic: The patient has no worrisome or suspicious skin lesions noted.    Impression and Assessment    Normal Physical Examination.    Plan    Counseling was provided regarding: Adequate Aerobic Exercise, Dental Visits, Flossing Teeth and Heart Healthy Diet.    The following was ordered for screening and health maintenance: Cardiac Calcium CT Scan, Colonoscopy, CBC w Automated Diff, Hep C Ab, CMP, Lipid Profile, TSH and U/A.       Immunizations Ordered and Administered: None.    Diagnoses and all orders for this visit:    1. Physical exam (Primary)  -     CT Cardiac Calcium Score Without Dye; Future  -     Comprehensive Metabolic Panel  -     Lipid Panel  -     CBC & Differential  -     TSH  -     Vitamin D 25 Hydroxy  -     POC Urinalysis Dipstick, Automated  -     Hepatitis C Antibody    2. Screening for colon cancer  -     Ambulatory Referral For Screening Colonoscopy        Return to Office    The patient was instructed to return for follow-up in 1 year. Next Visit: Physical Examination. The patient was instructed to return sooner if the condition changes, worsens, or does not resolve.

## 2021-06-25 LAB
25(OH)D3 SERPL-MCNC: 61.2 NG/ML (ref 30–100)
ALBUMIN SERPL-MCNC: 4.6 G/DL (ref 3.5–5.2)
ALBUMIN/GLOB SERPL: 1.8 G/DL
ALP SERPL-CCNC: 50 U/L (ref 39–117)
ALT SERPL W P-5'-P-CCNC: 17 U/L (ref 1–33)
ANION GAP SERPL CALCULATED.3IONS-SCNC: 11.7 MMOL/L (ref 5–15)
AST SERPL-CCNC: 22 U/L (ref 1–32)
BASOPHILS # BLD AUTO: 0.03 10*3/MM3 (ref 0–0.2)
BASOPHILS NFR BLD AUTO: 0.4 % (ref 0–1.5)
BILIRUB SERPL-MCNC: 1.2 MG/DL (ref 0–1.2)
BUN SERPL-MCNC: 13 MG/DL (ref 6–20)
BUN/CREAT SERPL: 13.5 (ref 7–25)
CALCIUM SPEC-SCNC: 9.6 MG/DL (ref 8.6–10.5)
CHLORIDE SERPL-SCNC: 103 MMOL/L (ref 98–107)
CHOLEST SERPL-MCNC: 199 MG/DL (ref 0–200)
CO2 SERPL-SCNC: 26.3 MMOL/L (ref 22–29)
CREAT SERPL-MCNC: 0.96 MG/DL (ref 0.57–1)
DEPRECATED RDW RBC AUTO: 40.7 FL (ref 37–54)
EOSINOPHIL # BLD AUTO: 0.15 10*3/MM3 (ref 0–0.4)
EOSINOPHIL NFR BLD AUTO: 2 % (ref 0.3–6.2)
ERYTHROCYTE [DISTWIDTH] IN BLOOD BY AUTOMATED COUNT: 11.9 % (ref 12.3–15.4)
GFR SERPL CREATININE-BSD FRML MDRD: 63 ML/MIN/1.73
GLOBULIN UR ELPH-MCNC: 2.6 GM/DL
GLUCOSE SERPL-MCNC: 77 MG/DL (ref 65–99)
HCT VFR BLD AUTO: 40.2 % (ref 34–46.6)
HCV AB SER DONR QL: NORMAL
HDLC SERPL-MCNC: 58 MG/DL (ref 40–60)
HGB BLD-MCNC: 13.6 G/DL (ref 12–15.9)
IMM GRANULOCYTES # BLD AUTO: 0.04 10*3/MM3 (ref 0–0.05)
IMM GRANULOCYTES NFR BLD AUTO: 0.5 % (ref 0–0.5)
LDLC SERPL CALC-MCNC: 116 MG/DL (ref 0–100)
LDLC/HDLC SERPL: 1.94 {RATIO}
LYMPHOCYTES # BLD AUTO: 1.86 10*3/MM3 (ref 0.7–3.1)
LYMPHOCYTES NFR BLD AUTO: 25.1 % (ref 19.6–45.3)
MCH RBC QN AUTO: 31.6 PG (ref 26.6–33)
MCHC RBC AUTO-ENTMCNC: 33.8 G/DL (ref 31.5–35.7)
MCV RBC AUTO: 93.5 FL (ref 79–97)
MONOCYTES # BLD AUTO: 0.75 10*3/MM3 (ref 0.1–0.9)
MONOCYTES NFR BLD AUTO: 10.1 % (ref 5–12)
NEUTROPHILS NFR BLD AUTO: 4.58 10*3/MM3 (ref 1.7–7)
NEUTROPHILS NFR BLD AUTO: 61.9 % (ref 42.7–76)
NRBC BLD AUTO-RTO: 0 /100 WBC (ref 0–0.2)
PLATELET # BLD AUTO: 218 10*3/MM3 (ref 140–450)
PMV BLD AUTO: 11.2 FL (ref 6–12)
POTASSIUM SERPL-SCNC: 4.3 MMOL/L (ref 3.5–5.2)
PROT SERPL-MCNC: 7.2 G/DL (ref 6–8.5)
RBC # BLD AUTO: 4.3 10*6/MM3 (ref 3.77–5.28)
SODIUM SERPL-SCNC: 141 MMOL/L (ref 136–145)
TRIGL SERPL-MCNC: 141 MG/DL (ref 0–150)
TSH SERPL DL<=0.05 MIU/L-ACNC: 1.24 UIU/ML (ref 0.27–4.2)
VLDLC SERPL-MCNC: 25 MG/DL (ref 5–40)
WBC # BLD AUTO: 7.41 10*3/MM3 (ref 3.4–10.8)

## 2021-06-29 RX ORDER — FLUOXETINE 10 MG/1
CAPSULE ORAL
Qty: 135 CAPSULE | Refills: 1 | Status: SHIPPED | OUTPATIENT
Start: 2021-06-29 | End: 2021-10-18

## 2021-07-25 ENCOUNTER — HOSPITAL ENCOUNTER (OUTPATIENT)
Dept: CT IMAGING | Facility: HOSPITAL | Age: 46
Discharge: HOME | End: 2021-07-25
Admitting: INTERNAL MEDICINE

## 2021-07-25 DIAGNOSIS — Z00.00 PHYSICAL EXAM: ICD-10-CM

## 2021-07-25 PROCEDURE — 75571 CT HRT W/O DYE W/CA TEST: CPT

## 2021-08-31 RX ORDER — SODIUM, POTASSIUM,MAG SULFATES 17.5-3.13G
2 SOLUTION, RECONSTITUTED, ORAL ORAL TAKE AS DIRECTED
Qty: 354 ML | Refills: 0 | Status: SHIPPED | OUTPATIENT
Start: 2021-08-31 | End: 2021-11-23

## 2021-10-18 RX ORDER — FLUOXETINE 10 MG/1
CAPSULE ORAL
Qty: 135 CAPSULE | Refills: 1 | Status: SHIPPED | OUTPATIENT
Start: 2021-10-18 | End: 2022-07-12 | Stop reason: SDUPTHER

## 2021-11-23 ENCOUNTER — OFFICE VISIT (OUTPATIENT)
Dept: OBSTETRICS AND GYNECOLOGY | Facility: CLINIC | Age: 46
End: 2021-11-23

## 2021-11-23 VITALS
SYSTOLIC BLOOD PRESSURE: 110 MMHG | WEIGHT: 148.4 LBS | HEIGHT: 64 IN | BODY MASS INDEX: 25.33 KG/M2 | DIASTOLIC BLOOD PRESSURE: 62 MMHG

## 2021-11-23 DIAGNOSIS — R14.0 BLOATING: ICD-10-CM

## 2021-11-23 DIAGNOSIS — Z01.419 WOMEN'S ANNUAL ROUTINE GYNECOLOGICAL EXAMINATION: Primary | ICD-10-CM

## 2021-11-23 DIAGNOSIS — Z12.39 ENCOUNTER FOR BREAST CANCER SCREENING USING NON-MAMMOGRAM MODALITY: ICD-10-CM

## 2021-11-23 PROCEDURE — 99396 PREV VISIT EST AGE 40-64: CPT | Performed by: OBSTETRICS & GYNECOLOGY

## 2021-11-23 NOTE — PROGRESS NOTES
GYN Annual Exam     CC - Here for annual exam.     Subjective   HPI  Rosa Isela Nolan is a 46 y.o. female, , who presents for annual well woman exam. No LMP recorded. Patient has had an ablation..  Periods are absent .  Patient reports problems with: none.  Partner Status: Marital Status: . Patient has not had the HPV vaccine.     SHe his happy with her endometrial ablation from 7 years ago.  She has noted     Additional OB/GYN History     Current contraception: contraceptive methods: ablation  Desires to: do not start contraception  Last Pap : 2020 Normal  Last Completed Pap Smear          PAP SMEAR (Every 3 Years) Next due on 2023  Pap IG, HPV-hr    10/24/2017  Done - neg, neg HPV              History of abnormal Pap smear: yes    Family history of uterine, colon, breast, or ovarian cancer: no  Previous Mammogram :  yes - 2020 normal  Performs monthly Self-Breast Exam: no  Exercises Regularly:yes  Feelings of Anxiety or Depression: no  Tobacco Usage?: No   OB History        4    Para   4    Term   4       0    AB   0    Living   4       SAB   0    IAB   0    Ectopic   0    Molar   0    Multiple   0    Live Births   4                Health Maintenance   Topic Date Due   • INFLUENZA VACCINE  Never done   • Annual Gynecologic Pelvic and Breast Exam  2021   • ANNUAL PHYSICAL  2022   • TDAP/TD VACCINES (2 - Td or Tdap) 2023   • COLORECTAL CANCER SCREENING  2023   • PAP SMEAR  2023   • HEPATITIS C SCREENING  Completed   • COVID-19 Vaccine  Completed   • Pneumococcal Vaccine 0-64  Aged Out     Past Surgical History:   Procedure Laterality Date   •  SECTION  10/2010   •  SECTION  2009   •  SECTION  2005   •  SECTION  2003   • CRYOTHERAPY  1996   • DIAGNOSTIC LAPAROSCOPY EXPLORATORY LAPAROTOMY  2008   • EAR TUBES     • ENDOMETRIAL ABLATION  2014   • ENDOMETRIAL ABLATION W/ NOVASURE   "01/01/2001   • HYSTEROSALPINGOGRAM      [facility]   1 TUBE BLOCKED   • MOLE REMOVAL  01/01/2001   • TUBAL ABDOMINAL LIGATION     • WISDOM TOOTH EXTRACTION             The additional following portions of the patient's history were reviewed and updated as appropriate: allergies, current medications, past family history, past medical history, past social history, past surgical history and problem list.    Review of Systems   Constitutional: Negative.    HENT: Negative.    Eyes: Negative.    Respiratory: Negative.    Cardiovascular: Negative.    Gastrointestinal: Negative.    Endocrine: Negative.    Genitourinary: Negative.    Musculoskeletal: Negative.    Skin: Negative.    Allergic/Immunologic: Negative.    Neurological: Negative.    Hematological: Negative.    Psychiatric/Behavioral: Negative.        I have reviewed and agree with the HPI, ROS, and historical information as entered above. Breana Garza MD    Objective   /62   Ht 162.6 cm (64\")   Wt 67.3 kg (148 lb 6.4 oz)   BMI 25.47 kg/m²     Physical Exam  Vitals and nursing note reviewed. Exam conducted with a chaperone present.   Constitutional:       Appearance: She is well-developed.   HENT:      Head: Normocephalic and atraumatic.   Neck:      Thyroid: No thyroid mass or thyromegaly.   Cardiovascular:      Rate and Rhythm: Normal rate and regular rhythm.      Heart sounds: No murmur heard.      Pulmonary:      Effort: Pulmonary effort is normal. No retractions.      Breath sounds: Normal breath sounds. No wheezing, rhonchi or rales.   Chest:      Chest wall: No mass or tenderness.   Breasts:      Right: Normal. No mass, nipple discharge, skin change or tenderness.      Left: Normal. No mass, nipple discharge, skin change or tenderness.       Abdominal:      General: Bowel sounds are normal.      Palpations: Abdomen is soft. Abdomen is not rigid. There is no mass.      Tenderness: There is no abdominal tenderness. There is no guarding.      Hernia: No " hernia is present. There is no hernia in the left inguinal area.   Genitourinary:     Labia:         Right: No rash, tenderness or lesion.         Left: No rash, tenderness or lesion.       Vagina: Normal. No vaginal discharge or lesions.      Cervix: No cervical motion tenderness, discharge, lesion or cervical bleeding.      Uterus: Normal. Not enlarged, not fixed and not tender.       Adnexa:         Right: No mass or tenderness.          Left: No mass or tenderness.        Rectum: No external hemorrhoid.   Musculoskeletal:      Cervical back: Normal range of motion. No muscular tenderness.   Neurological:      Mental Status: She is alert and oriented to person, place, and time.   Psychiatric:         Behavior: Behavior normal.         Assessment/Plan       Encounter Diagnoses   Name Primary?   • Women's annual routine gynecological examination Yes   • Encounter for breast cancer screening using non-mammogram modality    • Bloating        Plan     1. Recommended use of Vitamin D replacement and getting adequate calcium in her diet. (1500mg)  2. Reviewed monthly self breast exams.  Instructed to call with lumps, pain, or breast discharge.    3. Continue yearly mammography  4. Reviewed HPV guidelines.  5. Reviewed exercise as a preventative health measures.   6. Bloating - she will return for u/s at beginning of the year.         Breana Garza MD  11/23/2021

## 2022-01-05 RX ORDER — ZOLPIDEM TARTRATE 5 MG/1
5 TABLET ORAL NIGHTLY PRN
Qty: 30 TABLET | Refills: 2 | Status: SHIPPED | OUTPATIENT
Start: 2022-01-05 | End: 2022-07-12 | Stop reason: SDUPTHER

## 2022-02-01 ENCOUNTER — OFFICE VISIT (OUTPATIENT)
Dept: OBSTETRICS AND GYNECOLOGY | Facility: CLINIC | Age: 47
End: 2022-02-01

## 2022-02-01 VITALS
HEIGHT: 64 IN | SYSTOLIC BLOOD PRESSURE: 112 MMHG | BODY MASS INDEX: 25.92 KG/M2 | WEIGHT: 151.8 LBS | DIASTOLIC BLOOD PRESSURE: 72 MMHG

## 2022-02-01 DIAGNOSIS — N83.201 CYST OF RIGHT OVARY: Primary | ICD-10-CM

## 2022-02-01 DIAGNOSIS — R14.0 BLOATING: ICD-10-CM

## 2022-02-01 PROCEDURE — 99213 OFFICE O/P EST LOW 20 MIN: CPT | Performed by: OBSTETRICS & GYNECOLOGY

## 2022-02-01 NOTE — PROGRESS NOTES
Chief Complaint   Patient presents with   • GYN follow up   • Bloated   • TVU today       Subjective   HPI  Rosa Isela Nolan is a 46 y.o. female, , who presents for bloating follow up. Patient has TVU today.      She states she has experienced this problem for atleast 1 year.  She describes the severity as moderate.  She states that the problem is stable.  The patient reports additional symptoms as none.      Her last LMP was Patient's last menstrual period was 2022 (exact date).  Periods are irregular. Dysmenorrhea:moderate, occurring randomly.  Patient reports problems with: none.  Partner Status: Marital Status: .  New Partners since last visit: no.  Desires STD Screening: no.    Additional OB/GYN History   Current contraception: contraceptive methods: Tubal ligation  Desires to: continue contraception  Last Pap : 2020  Last Completed Pap Smear          PAP SMEAR (Every 3 Years) Next due on 2023  Pap IG, HPV-hr    10/24/2017  Done - neg, neg HPV              History of abnormal Pap smear: no  Last mammogram: 2021 , negative per pt.   Last Completed Mammogram     This patient has no relevant Health Maintenance data.        Tobacco Usage?: No   OB History        4    Para   4    Term   4       0    AB   0    Living   4       SAB   0    IAB   0    Ectopic   0    Molar   0    Multiple   0    Live Births   4                Health Maintenance   Topic Date Due   • COVID-19 Vaccine (3 - Booster for Pfizer series) 07/15/2021   • INFLUENZA VACCINE  Never done   • ANNUAL PHYSICAL  2022   • Annual Gynecologic Pelvic and Breast Exam  2022   • TDAP/TD VACCINES (2 - Td or Tdap) 2023   • COLORECTAL CANCER SCREENING  2023   • PAP SMEAR  2023   • HEPATITIS C SCREENING  Completed   • Pneumococcal Vaccine 0-64  Aged Out       The additional following portions of the patient's history were reviewed and updated as appropriate: allergies, current  "medications, past family history, past medical history, past social history, past surgical history and problem list.    Review of Systems    I have reviewed and agree with the HPI, ROS, and historical information as entered above. Breana Garza MD    Objective   /72   Ht 162.6 cm (64\")   Wt 68.9 kg (151 lb 12.8 oz)   LMP 01/22/2022 (Exact Date)   Breastfeeding No   BMI 26.06 kg/m²     Physical Exam  Constitutional:       Appearance: She is well-developed.   HENT:      Head: Normocephalic.   Eyes:      Conjunctiva/sclera: Conjunctivae normal.   Pulmonary:      Effort: Pulmonary effort is normal.   Psychiatric:         Behavior: Behavior normal.         Assessment/Plan     Assessment     Problem List Items Addressed This Visit     None      Visit Diagnoses     Cyst of right ovary    -  Primary    Bloating                Plan     Return in about 3 months (around 5/1/2022) for US with Next Visit.  2.   Bloating - reviewed u/s and no free fluid.  3.1 mainly simple ovarian cyst.  Discussed options.   Repeat u/s in 3 months.      Breana Garza MD  02/01/2022  "

## 2022-07-12 ENCOUNTER — OFFICE VISIT (OUTPATIENT)
Dept: INTERNAL MEDICINE | Facility: CLINIC | Age: 47
End: 2022-07-12

## 2022-07-12 VITALS
HEART RATE: 72 BPM | SYSTOLIC BLOOD PRESSURE: 108 MMHG | HEIGHT: 64 IN | RESPIRATION RATE: 12 BRPM | BODY MASS INDEX: 25.1 KG/M2 | TEMPERATURE: 98.2 F | DIASTOLIC BLOOD PRESSURE: 76 MMHG | WEIGHT: 147 LBS

## 2022-07-12 DIAGNOSIS — Z00.00 PHYSICAL EXAM: Primary | ICD-10-CM

## 2022-07-12 DIAGNOSIS — Z13.220 SCREENING FOR LIPID DISORDERS: ICD-10-CM

## 2022-07-12 DIAGNOSIS — Z12.11 SCREENING FOR COLON CANCER: ICD-10-CM

## 2022-07-12 LAB
BASOPHILS # BLD AUTO: 0.04 10*3/MM3 (ref 0–0.2)
BASOPHILS NFR BLD AUTO: 0.5 % (ref 0–1.5)
BILIRUB BLD-MCNC: NEGATIVE MG/DL
CLARITY, POC: CLEAR
COLOR UR: YELLOW
DEPRECATED RDW RBC AUTO: 41.3 FL (ref 37–54)
EOSINOPHIL # BLD AUTO: 0.18 10*3/MM3 (ref 0–0.4)
EOSINOPHIL NFR BLD AUTO: 2.1 % (ref 0.3–6.2)
ERYTHROCYTE [DISTWIDTH] IN BLOOD BY AUTOMATED COUNT: 12 % (ref 12.3–15.4)
EXPIRATION DATE: NORMAL
GLUCOSE UR STRIP-MCNC: NEGATIVE MG/DL
HCT VFR BLD AUTO: 38.3 % (ref 34–46.6)
HGB BLD-MCNC: 12.9 G/DL (ref 12–15.9)
IMM GRANULOCYTES # BLD AUTO: 0.04 10*3/MM3 (ref 0–0.05)
IMM GRANULOCYTES NFR BLD AUTO: 0.5 % (ref 0–0.5)
KETONES UR QL: NEGATIVE
LEUKOCYTE EST, POC: NEGATIVE
LYMPHOCYTES # BLD AUTO: 1.92 10*3/MM3 (ref 0.7–3.1)
LYMPHOCYTES NFR BLD AUTO: 21.9 % (ref 19.6–45.3)
Lab: NORMAL
MCH RBC QN AUTO: 31.4 PG (ref 26.6–33)
MCHC RBC AUTO-ENTMCNC: 33.7 G/DL (ref 31.5–35.7)
MCV RBC AUTO: 93.2 FL (ref 79–97)
MONOCYTES # BLD AUTO: 0.97 10*3/MM3 (ref 0.1–0.9)
MONOCYTES NFR BLD AUTO: 11.1 % (ref 5–12)
NEUTROPHILS NFR BLD AUTO: 5.6 10*3/MM3 (ref 1.7–7)
NEUTROPHILS NFR BLD AUTO: 63.9 % (ref 42.7–76)
NITRITE UR-MCNC: NEGATIVE MG/ML
NRBC BLD AUTO-RTO: 0 /100 WBC (ref 0–0.2)
PH UR: 7 [PH] (ref 5–8)
PLATELET # BLD AUTO: 230 10*3/MM3 (ref 140–450)
PMV BLD AUTO: 11 FL (ref 6–12)
PROT UR STRIP-MCNC: NEGATIVE MG/DL
RBC # BLD AUTO: 4.11 10*6/MM3 (ref 3.77–5.28)
RBC # UR STRIP: NEGATIVE /UL
SP GR UR: 1 (ref 1–1.03)
UROBILINOGEN UR QL: NORMAL
WBC NRBC COR # BLD: 8.75 10*3/MM3 (ref 3.4–10.8)

## 2022-07-12 PROCEDURE — 84443 ASSAY THYROID STIM HORMONE: CPT | Performed by: INTERNAL MEDICINE

## 2022-07-12 PROCEDURE — 80061 LIPID PANEL: CPT | Performed by: INTERNAL MEDICINE

## 2022-07-12 PROCEDURE — 81003 URINALYSIS AUTO W/O SCOPE: CPT | Performed by: INTERNAL MEDICINE

## 2022-07-12 PROCEDURE — 80053 COMPREHEN METABOLIC PANEL: CPT | Performed by: INTERNAL MEDICINE

## 2022-07-12 PROCEDURE — 85025 COMPLETE CBC W/AUTO DIFF WBC: CPT | Performed by: INTERNAL MEDICINE

## 2022-07-12 PROCEDURE — 99396 PREV VISIT EST AGE 40-64: CPT | Performed by: INTERNAL MEDICINE

## 2022-07-12 RX ORDER — FLUOXETINE 10 MG/1
CAPSULE ORAL
Qty: 135 CAPSULE | Refills: 1 | Status: SHIPPED | OUTPATIENT
Start: 2022-07-12 | End: 2023-01-17

## 2022-07-12 RX ORDER — ZOLPIDEM TARTRATE 5 MG/1
5 TABLET ORAL NIGHTLY PRN
Qty: 30 TABLET | Refills: 2 | Status: SHIPPED | OUTPATIENT
Start: 2022-07-12 | End: 2022-11-30

## 2022-07-12 NOTE — ADDENDUM NOTE
Addended by: ALVIN LONDONO on: 7/12/2022 04:07 PM     Modules accepted: Orders     Please advise. Pt was here on 02/13/2019 and had discussed this with Sofia at the time.

## 2022-07-12 NOTE — PROGRESS NOTES
Chief Complaint   Patient presents with   • Annual Exam       History of Present Illness      The patient presents for an established patient physical examination and health maintenance visit.    Review of Systems    CONSTITUTIONAL- Denies Unexplained Weight Loss, Fever, Chills, Sweats, Fatigue, Weakness or Malaise.    NECK- Denies Decreased Range of Motion, Stiffness, Thyroid Nodules, Enlarged Lymph Nodes or Goiter.    EYES- Denies Eye Pain, Eye Drainage, Eye Redness, Eye Discharge, Decreased Vision, Visual Disturbance, Diplopia, Visual Loss or Swollen Eyelid.    HENT- Denies Nasal Discharge, Sore Throat, Ear Pain, Ear Drainage, Headache, Sinus Pain, Nasal Congestion, Decreased Hearing or Tinnitus.    PULMONARY- Denies Wheezing, Dyspnea, Sputum Production, Cough, Hemoptysis or Pleuritic Chest Pain.    GASTROINTESTINAL- Denies Abdominal Pain, Nausea, Vomiting, Diarrhea, Blood per Rectum, Constipation or Heartburn.    CARDIOVASCULAR- Denies Chest Pain, Claudication, Edema, Syncope, Palpitations or Irregular Heart Beat.    GENITOURINARY- Denies Dysuria, Hematuria, Urinary Frequency, Urinary Leakage, Pelvic Pain, Vaginal Prolapse, Vaginal Discharge, Dyspareunia or Abnormal Menses.    ENDOCRINE- Denies Cold Intolerance, Depression, Hair Loss, Heat Intolerance, Memory Loss, Polydypsia, Polyphagia, Polyuria, Sleep Disturbance or Weight Gain.    NEUROLOGIC- Denies Seizures, Visual Changes, Confusion or Excessive Daytime Sleepiness.    MUSCULOSKELETAL- Denies Joint Pain, Joint Stiffness, Decreased Range of Motion, Joint Swelling or Erythema of Joints.    INTEGUMENTARY- Denies Rash, Lumps, Sores, Itching, Dryness, Color Change, Changes in Hair, Brittle Nails, Discolored Nails, Thickened Nails, Growths or Alopecia.    Medications      Current Outpatient Medications:   •  Cholecalciferol (VITAMIN D3) 1000 units capsule, Take 1,000 Units by mouth Daily., Disp: , Rfl:   •  cyclobenzaprine (FLEXERIL) 10 MG tablet, Take 10 mg by  "mouth Every 8 (Eight) Hours As Needed for Muscle Spasms., Disp: , Rfl: 2  •  FLUoxetine (PROzac) 10 MG capsule, TAKE 1 CAPSULE BY MOUTH EVERY DAY AND TAKE 2 CAPSULES ON PREMENSES, Disp: 135 capsule, Rfl: 1  •  magnesium gluconate (MAGONATE) 500 MG tablet, Take 500 mg by mouth Daily., Disp: , Rfl:   •  spironolactone (ALDACTONE) 25 MG tablet, Take 75 mg by mouth Daily., Disp: , Rfl: 5  •  valACYclovir (VALTREX) 1000 MG tablet, Take 1,000 mg by mouth 2 (Two) Times a Day As Needed., Disp: , Rfl:   •  zolpidem (AMBIEN) 5 MG tablet, Take 1 tablet by mouth At Night As Needed for Sleep., Disp: 30 tablet, Rfl: 2     Allergies    Allergies   Allergen Reactions   • Penicillins Rash       Problem List    Patient Active Problem List   Diagnosis   • Status post endometrial ablation       Medications, Allergies, Problems List and Past History were reviewed and updated.    Physical Examination    /76 (BP Location: Left arm, Patient Position: Sitting, Cuff Size: Adult)   Pulse 72   Temp 98.2 °F (36.8 °C) (Infrared)   Resp 12   Ht 162.6 cm (64\")   Wt 66.7 kg (147 lb)   LMP  (LMP Unknown)   Breastfeeding No   BMI 25.23 kg/m²     HEENT: Head- Normocephalic Atraumatic. Facies- Within normal limits. Pinnas- Normal texture and shape bilaterally. Canals- Normal bilaterally. TMs- Normal bilaterally. Nares- Patent bilaterally. Nasal Septum- is normal. There is no tenderness to palpation over the frontal or maxillary sinuses. Lids- Normal bilaterally. Conjunctiva- Clear bilaterally. Sclera- Anicteric bilaterally. Oropharynx- Moist with no lesions. Tonsils- No enlargement, erythema or exudate.    Neck: Thyroid- non enlarged, symmetric and has no nodules. No bruits are detected. ROM- Normal Range of Motion with no rigidity.    Lungs: Auscultation- Clear to auscultation bilaterally. There are no retractions, clubbing or cyanosis. The Expiratory to Inspiratory ratio is equal.    Lymph Nodes: Cervical- no enlarged lymph nodes noted. " Clavicular- no enlarged supraclavicular lymph nodes noted. Axillary- no enlarged axillary lymph nodes noted. Inguinal- no enlarged inguinal lymph nodes noted.    Cardiovascular: There are no carotid bruits. Heart- Normal Rate with Regular rhythm and no murmurs. There are no gallops. There are no rubs. In the lower extremities there is no edema. The upper extremities do not have edema.    Abdomen: Soft, benign, non-tender with no masses, hernias, organomegaly or scars.    Breast: Normal contours bilaterally with no masses, discharge, skin changes or lumps. There are no scars noted.    Dermatologic: The patient has no worrisome or suspicious skin lesions noted.    Impression and Assessment    Normal Physical Examination.    Encounter for Screening for Malignant Neoplasm of the Colon.    Plan    Counseling was provided regarding: Adequate Aerobic Exercise, Dental Visits, Flossing Teeth and Heart Healthy Diet.    The following was ordered for screening and health maintenance: CBC w Automated Diff, CMP, Colonoscopy, Lipid Profile, TSH and U/A.       Immunizations Ordered and Administered: None.    Diagnoses and all orders for this visit:    1. Physical exam (Primary)  -     CBC & Differential; Future  -     Comprehensive Metabolic Panel; Future  -     TSH; Future  -     POC Urinalysis Dipstick, Automated; Future    2. Screening for colon cancer  -     Ambulatory Referral For Screening Colonoscopy    3. Screening for lipid disorders  -     Lipid Panel; Future    Other orders  -     FLUoxetine (PROzac) 10 MG capsule; TAKE 1 CAPSULE BY MOUTH EVERY DAY AND TAKE 2 CAPSULES ON PREMENSES  Dispense: 135 capsule; Refill: 1  -     zolpidem (AMBIEN) 5 MG tablet; Take 1 tablet by mouth At Night As Needed for Sleep.  Dispense: 30 tablet; Refill: 2        Return to Office    The patient was instructed to return for follow-up in 1 year. Next Visit: Physical Examination. The patient was instructed to return sooner if the condition  changes, worsens, or does not resolve.

## 2022-07-13 LAB
ALBUMIN SERPL-MCNC: 4.6 G/DL (ref 3.5–5.2)
ALBUMIN/GLOB SERPL: 1.8 G/DL
ALP SERPL-CCNC: 55 U/L (ref 39–117)
ALT SERPL W P-5'-P-CCNC: 17 U/L (ref 1–33)
ANION GAP SERPL CALCULATED.3IONS-SCNC: 10.6 MMOL/L (ref 5–15)
AST SERPL-CCNC: 23 U/L (ref 1–32)
BILIRUB SERPL-MCNC: 1.1 MG/DL (ref 0–1.2)
BUN SERPL-MCNC: 11 MG/DL (ref 6–20)
BUN/CREAT SERPL: 13.6 (ref 7–25)
CALCIUM SPEC-SCNC: 9.4 MG/DL (ref 8.6–10.5)
CHLORIDE SERPL-SCNC: 103 MMOL/L (ref 98–107)
CHOLEST SERPL-MCNC: 213 MG/DL (ref 0–200)
CO2 SERPL-SCNC: 26.4 MMOL/L (ref 22–29)
CREAT SERPL-MCNC: 0.81 MG/DL (ref 0.57–1)
EGFRCR SERPLBLD CKD-EPI 2021: 90.2 ML/MIN/1.73
GLOBULIN UR ELPH-MCNC: 2.6 GM/DL
GLUCOSE SERPL-MCNC: 79 MG/DL (ref 65–99)
HDLC SERPL-MCNC: 69 MG/DL (ref 40–60)
LDLC SERPL CALC-MCNC: 119 MG/DL (ref 0–100)
LDLC/HDLC SERPL: 1.66 {RATIO}
POTASSIUM SERPL-SCNC: 3.9 MMOL/L (ref 3.5–5.2)
PROT SERPL-MCNC: 7.2 G/DL (ref 6–8.5)
SODIUM SERPL-SCNC: 140 MMOL/L (ref 136–145)
TRIGL SERPL-MCNC: 146 MG/DL (ref 0–150)
TSH SERPL DL<=0.05 MIU/L-ACNC: 1.21 UIU/ML (ref 0.27–4.2)
VLDLC SERPL-MCNC: 25 MG/DL (ref 5–40)

## 2022-09-07 RX ORDER — SODIUM, POTASSIUM,MAG SULFATES 17.5-3.13G
SOLUTION, RECONSTITUTED, ORAL ORAL
Qty: 354 ML | Refills: 0 | Status: SHIPPED | OUTPATIENT
Start: 2022-09-07 | End: 2022-11-29

## 2022-11-29 ENCOUNTER — OFFICE VISIT (OUTPATIENT)
Dept: OBSTETRICS AND GYNECOLOGY | Facility: CLINIC | Age: 47
End: 2022-11-29

## 2022-11-29 VITALS
BODY MASS INDEX: 25.95 KG/M2 | DIASTOLIC BLOOD PRESSURE: 82 MMHG | WEIGHT: 152 LBS | HEIGHT: 64 IN | SYSTOLIC BLOOD PRESSURE: 118 MMHG

## 2022-11-29 DIAGNOSIS — Z12.39 ENCOUNTER FOR BREAST CANCER SCREENING USING NON-MAMMOGRAM MODALITY: ICD-10-CM

## 2022-11-29 DIAGNOSIS — Z01.419 WOMEN'S ANNUAL ROUTINE GYNECOLOGICAL EXAMINATION: Primary | ICD-10-CM

## 2022-11-29 DIAGNOSIS — Z98.890 STATUS POST ENDOMETRIAL ABLATION: ICD-10-CM

## 2022-11-29 DIAGNOSIS — Z12.11 COLON CANCER SCREENING: ICD-10-CM

## 2022-11-29 PROCEDURE — 99396 PREV VISIT EST AGE 40-64: CPT | Performed by: OBSTETRICS & GYNECOLOGY

## 2022-11-29 RX ORDER — VALACYCLOVIR HYDROCHLORIDE 1 G/1
1000 TABLET, FILM COATED ORAL DAILY
Qty: 60 TABLET | Refills: 1 | Status: SHIPPED | OUTPATIENT
Start: 2022-11-29

## 2022-11-29 NOTE — PROGRESS NOTES
Gynecologic Annual Exam Note          GYN Annual Exam     Gynecologic Exam (annual)        Subjective     HPI  Rosa Isela Nolan is a 47 y.o. female, , who presents for annual well woman exam as a established patient . No LMP recorded. Patient has had an ablation..  Periods are absent secondary to previous endometrial ablation . Patient reports problems with: none.  Partner Status: Marital Status: . She is is sexually active. She has not had new partners.. STD testing recommendations have been explained to the patient and she does not desire STD testing. There were no changes to her medical or surgical history since her last visit..       Additional OB/GYN History   Current contraception: contraceptive methods: Tubal ligation  Desires to: continue contraception    Last Pap : 20. Result: negative. HPV: negative  Last Completed Pap Smear          PAP SMEAR (Every 3 Years) Next due on 2023  Pap IG, HPV-hr    10/24/2017  Done - neg, neg HPV              History of abnormal Pap smear: yes -  HPV, Cryotherapy   Family history of uterine, colon, breast, or ovarian cancer: yes - Breast Ca-PGM  Performs monthly Self-Breast Exam: no  Last mammogram: 20. Done at .    Last Completed Mammogram     This patient has no relevant Health Maintenance data.          History of abnormal mammogram: no    Colonoscopy: has never had a colonoscopy.  Exercises Regularly: yes  Feelings of Anxiety or Depression: no  Tobacco Usage?: No       Current Outpatient Medications:   •  Cholecalciferol (VITAMIN D3) 1000 units capsule, Take 1,000 Units by mouth Daily., Disp: , Rfl:   •  cyclobenzaprine (FLEXERIL) 10 MG tablet, Take 10 mg by mouth Every 8 (Eight) Hours As Needed for Muscle Spasms., Disp: , Rfl: 2  •  FLUoxetine (PROzac) 10 MG capsule, TAKE 1 CAPSULE BY MOUTH EVERY DAY AND TAKE 2 CAPSULES ON PREMENSES, Disp: 135 capsule, Rfl: 1  •  magnesium gluconate (MAGONATE) 500 MG tablet, Take 500  mg by mouth Daily., Disp: , Rfl:   •  spironolactone (ALDACTONE) 25 MG tablet, Take 75 mg by mouth Daily., Disp: , Rfl: 5  •  valACYclovir (VALTREX) 1000 MG tablet, Take 1 tablet by mouth Daily., Disp: 60 tablet, Rfl: 1  •  zolpidem (AMBIEN) 5 MG tablet, Take 1 tablet by mouth At Night As Needed for Sleep., Disp: 30 tablet, Rfl: 2     Patient is requesting refills of Valtrex.    OB History        4    Para   4    Term   4       0    AB   0    Living   4       SAB   0    IAB   0    Ectopic   0    Molar   0    Multiple   0    Live Births   4                Past Medical History:   Diagnosis Date   • Abnormal Pap smear of cervix    • Anemia    • Anxiety Recurrent    Postpartum, 2017   • Clotting disorder (HCC)     Endometriosis   • Depression    • Endometriosis    • Female infertility [id number]   • HPV (human papilloma virus) infection    • Migraine [id number]    Post Covid headaches   • PMS (premenstrual syndrome) Recurrent   • Urinary tract infection Recurrent   • Varicella         Past Surgical History:   Procedure Laterality Date   •  SECTION  10/2010   •  SECTION  2009   •  SECTION  2005   •  SECTION  2003   •  SECTION WITH TUBAL  10/2010   • CRYOTHERAPY  1996   • DIAGNOSTIC LAPAROSCOPY EXPLORATORY LAPAROTOMY  2008   • EAR TUBES     • ENDOMETRIAL ABLATION  2014   • ENDOMETRIAL ABLATION W/ NOVASURE  2001   • HYSTEROSALPINGOGRAM      [facility]   1 TUBE BLOCKED   • HYSTEROSCOPY     • MOLE REMOVAL  2001   • PELVIC LAPAROSCOPY     • TUBAL ABDOMINAL LIGATION     • WISDOM TOOTH EXTRACTION         Health Maintenance   Topic Date Due   • MAMMOGRAM  2021   • COVID-19 Vaccine (4 - Booster for Pfizer series) 2022   • INFLUENZA VACCINE  Never done   • Annual Gynecologic Pelvic and Breast Exam  2022   • ANNUAL PHYSICAL  2023   • TDAP/TD VACCINES (2 - Td or Tdap) 2023   • COLORECTAL CANCER  "SCREENING  11/18/2023   • PAP SMEAR  11/25/2023   • HEPATITIS C SCREENING  Completed   • Pneumococcal Vaccine 0-64  Aged Out       The additional following portions of the patient's history were reviewed and updated as appropriate: allergies, current medications, past family history, past medical history, past social history, past surgical history and problem list.    Review of Systems   Constitutional: Negative.    HENT: Negative.    Eyes: Negative.    Respiratory: Negative.    Cardiovascular: Negative.    Gastrointestinal: Negative.    Endocrine: Negative.    Genitourinary: Negative.    Musculoskeletal: Negative.    Skin: Negative.    Allergic/Immunologic: Negative.    Neurological: Negative.    Hematological: Negative.    Psychiatric/Behavioral: Negative.          I have reviewed and agree with the HPI, ROS, and historical information as entered above. Breana Garza MD      Objective   /82   Ht 162.6 cm (64\")   Wt 68.9 kg (152 lb)   BMI 26.09 kg/m²     Physical Exam  Vitals and nursing note reviewed. Exam conducted with a chaperone present.   Constitutional:       Appearance: She is well-developed.   HENT:      Head: Normocephalic and atraumatic.   Neck:      Thyroid: No thyroid mass or thyromegaly.   Cardiovascular:      Rate and Rhythm: Normal rate and regular rhythm.      Heart sounds: No murmur heard.  Pulmonary:      Effort: Pulmonary effort is normal. No retractions.      Breath sounds: Normal breath sounds. No wheezing, rhonchi or rales.   Chest:      Chest wall: No mass or tenderness.   Breasts:     Right: Normal. No mass, nipple discharge, skin change or tenderness.      Left: Normal. No mass, nipple discharge, skin change or tenderness.   Abdominal:      General: Bowel sounds are normal.      Palpations: Abdomen is soft. Abdomen is not rigid. There is no mass.      Tenderness: There is no abdominal tenderness. There is no guarding.      Hernia: No hernia is present. There is no hernia in the " left inguinal area.   Genitourinary:     Labia:         Right: No rash, tenderness or lesion.         Left: No rash, tenderness or lesion.       Vagina: Normal. No vaginal discharge or lesions.      Cervix: No cervical motion tenderness, discharge, lesion or cervical bleeding.      Uterus: Normal. Not enlarged, not fixed and not tender.       Adnexa:         Right: No mass or tenderness.          Left: No mass or tenderness.        Rectum: No external hemorrhoid.   Musculoskeletal:      Cervical back: Normal range of motion. No muscular tenderness.   Neurological:      Mental Status: She is alert and oriented to person, place, and time.   Psychiatric:         Behavior: Behavior normal.            Assessment and Plan    Problem List Items Addressed This Visit        Genitourinary and Reproductive     Status post endometrial ablation   Other Visit Diagnoses     Women's annual routine gynecological examination    -  Primary    Encounter for breast cancer screening using non-mammogram modality        Relevant Orders    Mammo Screening Digital Tomosynthesis Bilateral With CAD    Colon cancer screening              1. GYN annual well woman exam.   2. Pap guidelines reviewed.  3. Reviewed monthly self breast exams.  Instructed to call with lumps, pain, or breast discharge.    4. Ordered Mammogram today   5. Ablation working well.  6. Refill Valtrex.  7. She has colonoscopy scheduled in January.  8. Return in about 1 year (around 11/29/2023), or if symptoms worsen or fail to improve.     Breana Garza MD  11/29/2022

## 2022-11-30 RX ORDER — ZOLPIDEM TARTRATE 5 MG/1
5 TABLET ORAL NIGHTLY PRN
Qty: 30 TABLET | Refills: 2 | Status: SHIPPED | OUTPATIENT
Start: 2022-11-30

## 2023-01-11 ENCOUNTER — LAB REQUISITION (OUTPATIENT)
Dept: LAB | Facility: HOSPITAL | Age: 48
End: 2023-01-11
Payer: COMMERCIAL

## 2023-01-11 ENCOUNTER — OUTSIDE FACILITY SERVICE (OUTPATIENT)
Dept: GASTROENTEROLOGY | Facility: CLINIC | Age: 48
End: 2023-01-11
Payer: COMMERCIAL

## 2023-01-11 DIAGNOSIS — D12.7 BENIGN NEOPLASM OF RECTOSIGMOID JUNCTION: ICD-10-CM

## 2023-01-11 DIAGNOSIS — Z83.71 FAMILY HISTORY OF COLONIC POLYPS: ICD-10-CM

## 2023-01-11 DIAGNOSIS — Z12.11 ENCOUNTER FOR SCREENING FOR MALIGNANT NEOPLASM OF COLON: ICD-10-CM

## 2023-01-11 PROCEDURE — 45385 COLONOSCOPY W/LESION REMOVAL: CPT | Performed by: INTERNAL MEDICINE

## 2023-01-11 PROCEDURE — 88305 TISSUE EXAM BY PATHOLOGIST: CPT

## 2023-01-13 LAB — REF LAB TEST METHOD: NORMAL

## 2023-01-17 RX ORDER — FLUOXETINE 10 MG/1
CAPSULE ORAL
Qty: 135 CAPSULE | Refills: 1 | Status: SHIPPED | OUTPATIENT
Start: 2023-01-17

## 2023-02-02 ENCOUNTER — HOSPITAL ENCOUNTER (OUTPATIENT)
Dept: MAMMOGRAPHY | Facility: HOSPITAL | Age: 48
Discharge: HOME | End: 2023-02-02
Admitting: OBSTETRICS & GYNECOLOGY
Payer: COMMERCIAL

## 2023-02-02 DIAGNOSIS — Z12.39 ENCOUNTER FOR BREAST CANCER SCREENING USING NON-MAMMOGRAM MODALITY: ICD-10-CM

## 2023-02-02 PROCEDURE — 77067 SCR MAMMO BI INCL CAD: CPT

## 2023-02-02 PROCEDURE — 77067 SCR MAMMO BI INCL CAD: CPT | Performed by: RADIOLOGY

## 2023-02-02 PROCEDURE — 77063 BREAST TOMOSYNTHESIS BI: CPT

## 2023-02-02 PROCEDURE — 77063 BREAST TOMOSYNTHESIS BI: CPT | Performed by: RADIOLOGY

## 2023-07-24 RX ORDER — NITROFURANTOIN 25; 75 MG/1; MG/1
100 CAPSULE ORAL 2 TIMES DAILY
Qty: 20 CAPSULE | Refills: 0 | Status: SHIPPED | OUTPATIENT
Start: 2023-07-24 | End: 2023-08-03

## 2023-12-05 ENCOUNTER — OFFICE VISIT (OUTPATIENT)
Dept: OBSTETRICS AND GYNECOLOGY | Facility: CLINIC | Age: 48
End: 2023-12-05
Payer: COMMERCIAL

## 2023-12-05 VITALS
DIASTOLIC BLOOD PRESSURE: 80 MMHG | WEIGHT: 152 LBS | SYSTOLIC BLOOD PRESSURE: 128 MMHG | BODY MASS INDEX: 25.95 KG/M2 | HEIGHT: 64 IN

## 2023-12-05 DIAGNOSIS — Z01.419 WOMEN'S ANNUAL ROUTINE GYNECOLOGICAL EXAMINATION: Primary | ICD-10-CM

## 2023-12-05 DIAGNOSIS — R23.2 HOT FLASHES: ICD-10-CM

## 2023-12-05 DIAGNOSIS — Z12.31 ENCOUNTER FOR SCREENING MAMMOGRAM FOR MALIGNANT NEOPLASM OF BREAST: ICD-10-CM

## 2023-12-05 DIAGNOSIS — Z98.890 STATUS POST ENDOMETRIAL ABLATION: ICD-10-CM

## 2023-12-05 RX ORDER — VALACYCLOVIR HYDROCHLORIDE 1 G/1
1000 TABLET, FILM COATED ORAL DAILY
Qty: 30 TABLET | Refills: 1 | Status: SHIPPED | OUTPATIENT
Start: 2023-12-05

## 2023-12-05 NOTE — PROGRESS NOTES
Gynecologic Annual Exam Note          GYN Annual Exam     Gynecologic Exam        Subjective     HPI  Rosa Isela Nolan is a 48 y.o. female, , who presents for annual well woman exam as a established patient. Patient's last menstrual period was 2023. Patient reports problems with:  hot flashes and night sweats. Patient states that she has changed her diet recently and this has helped. Patient states that her hot flashes and night sweats are infrequent.  Her periods occur every 25-35 days, lasting 10-14 days. The flow is spotting to light. She reports dysmenorrhea is moderate, occurring first 1-2 days of flow. Patient states that her periods will last for 10-14 days since she had her endometrial ablation. Partner Status: Marital Status: . She is is sexually active. She has not had new partners. STD testing recommendations have been explained to the patient and she does not desire STD testing. There were no changes to her medical or surgical history since her last visit.      Additional OB/GYN History   Current contraception: contraceptive methods: Tubal ligation  Desires to: do not start contraception    Last Pap : 2020. Result: negative. HPV: negative.   Last Completed Pap Smear       This patient has no relevant Health Maintenance data.          History of abnormal Pap smear: yes - h/o cryotherapy in   Family history of uterine, colon, breast, or ovarian cancer: yes - PGM- breast CA  Performs monthly Self-Breast Exam: No. Patient reports performing self-breast exams irregularly.  Last mammogram: 2023. Done at .  Last Completed Mammogram            Ordered - MAMMOGRAM (Yearly) Ordered on 2023  Mammo Screening Digital Tomosynthesis Bilateral With CAD    2020  Mammo Screening Digital Tomosynthesis Bilateral With CAD    2019  Mammo Screening Digital Tomosynthesis Bilateral With CAD    2017  Mammo Screening Digital Tomosynthesis Bilateral  With CAD                    History of abnormal mammogram: no    Colonoscopy: has had a colonoscopy 11 month(s) ago.  Exercises Regularly: yes  Feelings of Anxiety or Depression: yes - Anxiety  Tobacco Usage?: No       Current Outpatient Medications:     Cholecalciferol (VITAMIN D3) 1000 units capsule, Take 5 capsules by mouth Daily., Disp: , Rfl:     cyclobenzaprine (FLEXERIL) 10 MG tablet, Take 1 tablet by mouth Every 8 (Eight) Hours As Needed for Muscle Spasms., Disp: , Rfl: 2    FLUoxetine (PROzac) 10 MG capsule, TAKE 1 CAPSULE BY MOUTH EVERY DAY AND TAKE 2 CAPSULES ON PREMENSES (Patient taking differently: Take 1 capsule by mouth Daily. TAKE 1 CAPSULE BY MOUTH EVERY DAY AND TAKE 2 CAPSULES ON PREMENSES), Disp: 135 capsule, Rfl: 1    spironolactone (ALDACTONE) 25 MG tablet, Take 3 tablets by mouth Daily., Disp: , Rfl: 5    zolpidem (AMBIEN) 5 MG tablet, Take 1 tablet by mouth At Night As Needed for Sleep., Disp: 30 tablet, Rfl: 2    valACYclovir (Valtrex) 1000 MG tablet, Take 1 tablet by mouth Daily., Disp: 30 tablet, Rfl: 1     Patient is requesting refills of Valacyclovir.    OB History          4    Para   4    Term   4       0    AB   0    Living   4         SAB   0    IAB   0    Ectopic   0    Molar   0    Multiple   0    Live Births   4                Past Medical History:   Diagnosis Date    Abnormal Pap smear of cervix     Anemia     Anxiety Recurrent    Postpartum, 2017    Clotting disorder     Endometriosis    Depression     Endometriosis 2008    Female infertility [id number]    HPV (human papilloma virus) infection 1997    Migraine [id number]    Post Covid headaches    PMS (premenstrual syndrome) Recurrent    Urinary tract infection Recurrent    Varicella         Past Surgical History:   Procedure Laterality Date     SECTION  10/2010     SECTION  2009     SECTION  2005     SECTION  2003     SECTION WITH TUBAL  10/2010     "CRYOTHERAPY  01/01/1996    DIAGNOSTIC LAPAROSCOPY EXPLORATORY LAPAROTOMY  05/2008    EAR TUBES      ENDOMETRIAL ABLATION  01/2014    ENDOMETRIAL ABLATION W/ NOVASURE  01/01/2001    HYSTEROSALPINGOGRAM      [facility]   1 TUBE BLOCKED    HYSTEROSCOPY  2008    MOLE REMOVAL  01/01/2001    PELVIC LAPAROSCOPY  2008    TUBAL ABDOMINAL LIGATION      WISDOM TOOTH EXTRACTION         Health Maintenance   Topic Date Due    BMI FOLLOWUP  Never done    INFLUENZA VACCINE  Never done    TDAP/TD VACCINES (2 - Td or Tdap) 08/14/2023    COVID-19 Vaccine (4 - 2023-24 season) 09/01/2023    PAP SMEAR  11/25/2023    MAMMOGRAM  02/02/2024    ANNUAL PHYSICAL  07/18/2024    Annual Gynecologic Pelvic and Breast Exam  12/06/2024    COLORECTAL CANCER SCREENING  01/12/2033    HEPATITIS C SCREENING  Completed    Pneumococcal Vaccine 0-64  Aged Out       The additional following portions of the patient's history were reviewed and updated as appropriate: allergies, current medications, past family history, past medical history, past social history, past surgical history, and problem list.    Review of Systems   Constitutional: Negative.    HENT: Negative.     Eyes: Negative.    Respiratory: Negative.     Cardiovascular: Negative.    Gastrointestinal: Negative.    Endocrine: Positive for heat intolerance (Hot flashes and Night sweats).   Genitourinary: Negative.    Musculoskeletal: Negative.    Skin: Negative.    Allergic/Immunologic: Negative.    Neurological: Negative.    Hematological: Negative.    Psychiatric/Behavioral: Negative.           I have reviewed and agree with the HPI, ROS, and historical information as entered above. Breana Garza MD          Objective   /80   Ht 163.2 cm (64.25\")   Wt 68.9 kg (152 lb)   LMP 11/07/2023   BMI 25.89 kg/m²     Physical Exam  Vitals and nursing note reviewed. Exam conducted with a chaperone present.   Constitutional:       Appearance: She is well-developed.   HENT:      Head: Normocephalic and " atraumatic.   Neck:      Thyroid: No thyroid mass or thyromegaly.   Cardiovascular:      Rate and Rhythm: Normal rate and regular rhythm.      Heart sounds: No murmur heard.  Pulmonary:      Effort: Pulmonary effort is normal. No retractions.      Breath sounds: Normal breath sounds. No wheezing, rhonchi or rales.   Chest:      Chest wall: No mass or tenderness.   Breasts:     Right: Normal. No mass, nipple discharge, skin change or tenderness.      Left: Normal. No mass, nipple discharge, skin change or tenderness.   Abdominal:      General: Bowel sounds are normal.      Palpations: Abdomen is soft. Abdomen is not rigid. There is no mass.      Tenderness: There is no abdominal tenderness. There is no guarding.      Hernia: No hernia is present. There is no hernia in the left inguinal area.   Genitourinary:     Labia:         Right: No rash, tenderness or lesion.         Left: No rash, tenderness or lesion.       Vagina: Normal. No vaginal discharge or lesions.      Cervix: No cervical motion tenderness, discharge, lesion or cervical bleeding.      Uterus: Normal. Not enlarged, not fixed and not tender.       Adnexa:         Right: No mass or tenderness.          Left: No mass or tenderness.        Rectum: No external hemorrhoid.   Musculoskeletal:      Cervical back: Normal range of motion. No muscular tenderness.   Neurological:      Mental Status: She is alert and oriented to person, place, and time.   Psychiatric:         Behavior: Behavior normal.            Assessment and Plan    Problem List Items Addressed This Visit          Genitourinary and Reproductive     Status post endometrial ablation     Other Visit Diagnoses       Women's annual routine gynecological examination    -  Primary    Relevant Orders    LIQUID-BASED PAP SMEAR WITH HPV GENOTYPING REGARDLESS OF INTERPRETATION ([facility])    Encounter for screening mammogram for malignant neoplasm of breast        Relevant Orders    Mammo Screening  Digital Tomosynthesis Bilateral With CAD    Hot flashes                GYN annual well woman exam.   Pap guidelines reviewed.  Recommended use of Vitamin D replacement and getting adequate calcium in her diet. (1500mg)  Reviewed monthly self breast exams.  Instructed to call with lumps, pain, or breast discharge.    Continue yearly mammography  Reviewed HPV guidelines.  Reviewed exercise as a preventative health measures.   Periods are still much better than before her ablation 10 years ago.  Understands options.  Anticpatory menopausal guidance.   Return in about 1 year (around 12/5/2024), or if symptoms worsen or fail to improve.     Breana Garza MD  12/05/2023

## 2023-12-07 LAB — REF LAB TEST METHOD: NORMAL

## 2024-01-12 RX ORDER — ZOLPIDEM TARTRATE 5 MG/1
5 TABLET ORAL NIGHTLY PRN
Qty: 30 TABLET | Refills: 0 | Status: SHIPPED | OUTPATIENT
Start: 2024-01-12

## 2024-02-26 ENCOUNTER — HOSPITAL ENCOUNTER (OUTPATIENT)
Dept: MAMMOGRAPHY | Facility: HOSPITAL | Age: 49
Discharge: HOME | End: 2024-02-26
Admitting: OBSTETRICS & GYNECOLOGY
Payer: COMMERCIAL

## 2024-02-26 DIAGNOSIS — Z12.31 ENCOUNTER FOR SCREENING MAMMOGRAM FOR MALIGNANT NEOPLASM OF BREAST: ICD-10-CM

## 2024-02-26 PROCEDURE — 77067 SCR MAMMO BI INCL CAD: CPT

## 2024-02-26 PROCEDURE — 77063 BREAST TOMOSYNTHESIS BI: CPT

## 2024-02-28 PROCEDURE — 77067 SCR MAMMO BI INCL CAD: CPT | Performed by: RADIOLOGY

## 2024-02-28 PROCEDURE — 77063 BREAST TOMOSYNTHESIS BI: CPT | Performed by: RADIOLOGY

## 2024-03-28 RX ORDER — ZOLPIDEM TARTRATE 5 MG/1
5 TABLET ORAL NIGHTLY PRN
Qty: 30 TABLET | Refills: 0 | Status: SHIPPED | OUTPATIENT
Start: 2024-03-28

## 2024-03-28 NOTE — TELEPHONE ENCOUNTER
Caller: Rosa Isela Nolan    Relationship: Self    Best call back number: [phone]     Requested Prescriptions:   Requested Prescriptions     Pending Prescriptions Disp Refills    zolpidem (AMBIEN) 5 MG tablet 30 tablet 0     Sig: Take 1 tablet by mouth At Night As Needed for Sleep.        Pharmacy where request should be sent: [facility] [id number] [address] - [phone] [facility] [phone]      Last office visit with prescribing clinician: 7/18/2023   Last telemedicine visit with prescribing clinician: Visit date not found   Next office visit with prescribing clinician: 7/22/2024     Additional details provided by patient: PATIENT IS OUT AND IS GOING OUT OF TOWN [facility].     Does the patient have less than a 3 day supply:  [x] Yes  [] No    Would you like a call back once the refill request has been completed: [] Yes [x] No    If the office needs to give you a call back, can they leave a voicemail: [] Yes [x] No    Cadance Dunaway, RegSched Rep   03/28/24 10:47 EDT

## 2024-04-12 ENCOUNTER — HOSPITAL ENCOUNTER (OUTPATIENT)
Dept: MAMMOGRAPHY | Facility: HOSPITAL | Age: 49
Discharge: HOME | End: 2024-04-12
Admitting: OBSTETRICS & GYNECOLOGY
Payer: COMMERCIAL

## 2024-04-12 DIAGNOSIS — R92.8 ABNORMAL MAMMOGRAM: ICD-10-CM

## 2024-04-12 PROCEDURE — G0279 TOMOSYNTHESIS, MAMMO: HCPCS

## 2024-04-12 PROCEDURE — 77065 DX MAMMO INCL CAD UNI: CPT

## 2024-06-05 RX ORDER — ZOLPIDEM TARTRATE 5 MG/1
5 TABLET ORAL NIGHTLY PRN
Qty: 30 TABLET | Refills: 2 | Status: SHIPPED | OUTPATIENT
Start: 2024-06-05

## 2024-07-22 ENCOUNTER — OFFICE VISIT (OUTPATIENT)
Dept: INTERNAL MEDICINE | Facility: CLINIC | Age: 49
End: 2024-07-22
Payer: COMMERCIAL

## 2024-07-22 VITALS
TEMPERATURE: 98 F | DIASTOLIC BLOOD PRESSURE: 78 MMHG | HEART RATE: 68 BPM | RESPIRATION RATE: 16 BRPM | BODY MASS INDEX: 24.63 KG/M2 | HEIGHT: 64 IN | SYSTOLIC BLOOD PRESSURE: 112 MMHG | WEIGHT: 144.25 LBS

## 2024-07-22 DIAGNOSIS — Z13.6 SCREENING FOR CARDIOVASCULAR CONDITION: ICD-10-CM

## 2024-07-22 DIAGNOSIS — Z00.00 PHYSICAL EXAM: Primary | ICD-10-CM

## 2024-07-22 DIAGNOSIS — Z23 IMMUNIZATION DUE: ICD-10-CM

## 2024-07-22 LAB
ALBUMIN SERPL-MCNC: 4.4 G/DL (ref 3.5–5.2)
ALBUMIN/GLOB SERPL: 1.6 G/DL
ALP SERPL-CCNC: 46 U/L (ref 39–117)
ALT SERPL W P-5'-P-CCNC: 13 U/L (ref 1–33)
ANION GAP SERPL CALCULATED.3IONS-SCNC: 9.3 MMOL/L (ref 5–15)
AST SERPL-CCNC: 23 U/L (ref 1–32)
BASOPHILS # BLD AUTO: 0.07 10*3/MM3 (ref 0–0.2)
BASOPHILS NFR BLD AUTO: 1 % (ref 0–1.5)
BILIRUB BLD-MCNC: NEGATIVE MG/DL
BILIRUB SERPL-MCNC: 1.1 MG/DL (ref 0–1.2)
BUN SERPL-MCNC: 10 MG/DL (ref 6–20)
BUN/CREAT SERPL: 11 (ref 7–25)
CALCIUM SPEC-SCNC: 9.2 MG/DL (ref 8.6–10.5)
CHLORIDE SERPL-SCNC: 103 MMOL/L (ref 98–107)
CHOLEST SERPL-MCNC: 198 MG/DL (ref 0–200)
CLARITY, POC: CLEAR
CO2 SERPL-SCNC: 25.7 MMOL/L (ref 22–29)
COLOR UR: YELLOW
CREAT SERPL-MCNC: 0.91 MG/DL (ref 0.57–1)
DEPRECATED RDW RBC AUTO: 41.1 FL (ref 37–54)
EGFRCR SERPLBLD CKD-EPI 2021: 77.5 ML/MIN/1.73
EOSINOPHIL # BLD AUTO: 0.19 10*3/MM3 (ref 0–0.4)
EOSINOPHIL NFR BLD AUTO: 2.8 % (ref 0.3–6.2)
ERYTHROCYTE [DISTWIDTH] IN BLOOD BY AUTOMATED COUNT: 12.2 % (ref 12.3–15.4)
EXPIRATION DATE: NORMAL
GLOBULIN UR ELPH-MCNC: 2.7 GM/DL
GLUCOSE SERPL-MCNC: 72 MG/DL (ref 65–99)
GLUCOSE UR STRIP-MCNC: NEGATIVE MG/DL
HCT VFR BLD AUTO: 38 % (ref 34–46.6)
HDLC SERPL-MCNC: 66 MG/DL (ref 40–60)
HGB BLD-MCNC: 13 G/DL (ref 12–15.9)
IMM GRANULOCYTES # BLD AUTO: 0.01 10*3/MM3 (ref 0–0.05)
IMM GRANULOCYTES NFR BLD AUTO: 0.1 % (ref 0–0.5)
KETONES UR QL: NEGATIVE
LDLC SERPL CALC-MCNC: 104 MG/DL (ref 0–100)
LDLC/HDLC SERPL: 1.5 {RATIO}
LEUKOCYTE EST, POC: NEGATIVE
LYMPHOCYTES # BLD AUTO: 2.05 10*3/MM3 (ref 0.7–3.1)
LYMPHOCYTES NFR BLD AUTO: 30.4 % (ref 19.6–45.3)
Lab: NORMAL
MCH RBC QN AUTO: 31.8 PG (ref 26.6–33)
MCHC RBC AUTO-ENTMCNC: 34.2 G/DL (ref 31.5–35.7)
MCV RBC AUTO: 92.9 FL (ref 79–97)
MONOCYTES # BLD AUTO: 0.91 10*3/MM3 (ref 0.1–0.9)
MONOCYTES NFR BLD AUTO: 13.5 % (ref 5–12)
NEUTROPHILS NFR BLD AUTO: 3.52 10*3/MM3 (ref 1.7–7)
NEUTROPHILS NFR BLD AUTO: 52.2 % (ref 42.7–76)
NITRITE UR-MCNC: NEGATIVE MG/ML
NRBC BLD AUTO-RTO: 0 /100 WBC (ref 0–0.2)
PH UR: 7 [PH] (ref 5–8)
PLATELET # BLD AUTO: 254 10*3/MM3 (ref 140–450)
PMV BLD AUTO: 10.9 FL (ref 6–12)
POTASSIUM SERPL-SCNC: 4.3 MMOL/L (ref 3.5–5.2)
PROT SERPL-MCNC: 7.1 G/DL (ref 6–8.5)
PROT UR STRIP-MCNC: NEGATIVE MG/DL
RBC # BLD AUTO: 4.09 10*6/MM3 (ref 3.77–5.28)
RBC # UR STRIP: NEGATIVE /UL
SODIUM SERPL-SCNC: 138 MMOL/L (ref 136–145)
SP GR UR: 1.01 (ref 1–1.03)
TRIGL SERPL-MCNC: 165 MG/DL (ref 0–150)
UROBILINOGEN UR QL: NORMAL
VLDLC SERPL-MCNC: 28 MG/DL (ref 5–40)
WBC NRBC COR # BLD AUTO: 6.75 10*3/MM3 (ref 3.4–10.8)

## 2024-07-22 PROCEDURE — 80053 COMPREHEN METABOLIC PANEL: CPT | Performed by: INTERNAL MEDICINE

## 2024-07-22 PROCEDURE — 81003 URINALYSIS AUTO W/O SCOPE: CPT | Performed by: INTERNAL MEDICINE

## 2024-07-22 PROCEDURE — 36415 COLL VENOUS BLD VENIPUNCTURE: CPT | Performed by: INTERNAL MEDICINE

## 2024-07-22 PROCEDURE — 85025 COMPLETE CBC W/AUTO DIFF WBC: CPT | Performed by: INTERNAL MEDICINE

## 2024-07-22 PROCEDURE — 90471 IMMUNIZATION ADMIN: CPT | Performed by: INTERNAL MEDICINE

## 2024-07-22 PROCEDURE — 80061 LIPID PANEL: CPT | Performed by: INTERNAL MEDICINE

## 2024-07-22 PROCEDURE — 90715 TDAP VACCINE 7 YRS/> IM: CPT | Performed by: INTERNAL MEDICINE

## 2024-07-22 PROCEDURE — 99396 PREV VISIT EST AGE 40-64: CPT | Performed by: INTERNAL MEDICINE

## 2024-07-22 PROCEDURE — 84443 ASSAY THYROID STIM HORMONE: CPT | Performed by: INTERNAL MEDICINE

## 2024-07-22 NOTE — LETTER
[facility]  Vaccine Consent Form    Patient Name:  Rosa Isela Nolan  Patient :  1975     Vaccine(s) Ordered    Tdap Vaccine Greater Than or Equal To 6yo IM        Screening Checklist  The following questions should be completed prior to vaccination. If you answer “yes” to any question, it does not necessarily mean you should not be vaccinated. It just means we may need to clarify or ask more questions. If a question is unclear, please ask your healthcare provider to explain it.    Yes No   Any fever or moderate to severe illness today (mild illness and/or antibiotic treatment are not contraindications)?     Do you have a history of a serious reaction to any previous vaccinations, such as anaphylaxis, encephalopathy within 7 days, Guillain-[place] syndrome within 6 weeks, seizure?     Have you received any live vaccine(s) (e.g MMR, DAWN) or any other vaccines in the last month (to ensure duplicate doses aren't given)?     Do you have an anaphylactic allergy to latex (DTaP, DTaP-IPV, Hep A, Hep B, MenB, RV, Td, Tdap), baker’s yeast (Hep B, HPV), polysorbates (RSV, nirsevimab, PCV 20, Rotavirrus, Tdap, Shingrix), or gelatin (DAWN, MMR)?     Do you have an anaphylactic allergy to neomycin (Rabies, DAWN, MMR, IPV, Hep A), polymyxin B (IPV), or streptomycin (IPV)?      Any cancer, leukemia, AIDS, or other immune system disorder? (DAWN, MMR, RV)     Do you have a parent, brother, or sister with an immune system problem (if immune competence of vaccine recipient clinically verified, can proceed)? (MMR, DAWN)     Any recent steroid treatments for >2 weeks, chemotherapy, or radiation treatment? (DAWN, MMR)     Have you received antibody-containing blood transfusions or IVIG in the past 11 months (recommended interval is dependent on product)? (MMR, DAWN)     Have you taken antiviral drugs (acyclovir, famciclovir, valacyclovir for DAWN) in the last 24 or 48 hours, respectively?      Are you pregnant or planning to become  "pregnant within 1 month? (DAWN, MMR, HPV, IPV, MenB, Abrexvy; For Hep B- refer to Engerix-B; For RSV - Abrysvo is indicated for 32-36 weeks of pregnancy from September to January)     For infants, have you ever been told your child has had intussusception or a medical emergency involving obstruction of the intestine (Rotavirus)? If not for an infant, can skip this question.         *Ordering Physicians/APC should be consulted if \"yes\" is checked by the patient or guardian above.  I have received, read, and understand the Vaccine Information Statement (VIS) for each vaccine ordered.  I have considered my or my child's health status as well as the health status of my close contacts.  I have taken the opportunity to discuss my vaccine questions with my or my child's health care provider.   I have requested that the ordered vaccine(s) be given to me or my child.  I understand the benefits and risks of the vaccines.  I understand that I should remain in the clinic for 15 minutes after receiving the vaccine(s).  _________________________________________________________  Signature of Patient or Parent/Legal Guardian ____________________  Date     "

## 2024-07-22 NOTE — PROGRESS NOTES
"Chief Complaint   Patient presents with    Annual Exam       History of Present Illness    The patient presents for an established patient physical examination and health maintenance visit.    Medications      Current Outpatient Medications:     Cholecalciferol (VITAMIN D3) 1000 units capsule, Take 5 capsules by mouth Daily., Disp: , Rfl:     cyclobenzaprine (FLEXERIL) 10 MG tablet, Take 1 tablet by mouth Every 8 (Eight) Hours As Needed for Muscle Spasms., Disp: , Rfl: 2    FLUoxetine (PROzac) 10 MG capsule, TAKE 1 CAPSULE BY MOUTH EVERY DAY AND TAKE 2 CAPSULES ON PREMENSES (Patient taking differently: Take 1 capsule by mouth Daily. TAKE 1 CAPSULE BY MOUTH EVERY DAY AND TAKE 2 CAPSULES ON PREMENSES), Disp: 135 capsule, Rfl: 1    spironolactone (ALDACTONE) 25 MG tablet, Take 3 tablets by mouth Daily., Disp: , Rfl: 5    valACYclovir (Valtrex) 1000 MG tablet, Take 1 tablet by mouth Daily., Disp: 30 tablet, Rfl: 1    zolpidem (AMBIEN) 5 MG tablet, TAKE ONE TABLET BY MOUTH ONCE NIGHTLY AS NEEDED FOR SLEEP, Disp: 30 tablet, Rfl: 2     Allergies    Allergies   Allergen Reactions    Penicillins Rash       Problem List    Patient Active Problem List   Diagnosis    Status post endometrial ablation       Medications, Allergies, Problems List and Past History were reviewed and updated.    Physical Examination    /78 (BP Location: Left arm, Patient Position: Sitting, Cuff Size: Adult)   Pulse 68   Temp 98 °F (36.7 °C) (Temporal)   Resp 16   Ht 163.2 cm (64.25\")   Wt 65.4 kg (144 lb 4 oz)   BMI 24.57 kg/m²       HEENT: Head- Normocephalic Atraumatic. Facies- Within normal limits. Pinnas- Normal texture and shape bilaterally. Canals- Normal bilaterally. TMs- Normal bilaterally. Nares- Patent bilaterally. Nasal Septum- is normal. There is no tenderness to palpation over the frontal or maxillary sinuses. Lids- Normal bilaterally. Conjunctiva- Clear bilaterally. Sclera- Anicteric bilaterally. Oropharynx- Moist with no " lesions. Tonsils- No enlargement, erythema or exudate.    Neck: Thyroid- non enlarged, symmetric and has no nodules. No bruits are detected. ROM- Normal Range of Motion with no rigidity.    Lungs: Auscultation- Clear to auscultation bilaterally. There are no retractions, clubbing or cyanosis. The Expiratory to Inspiratory ratio is equal.    Lymph Nodes: Cervical- no enlarged lymph nodes noted. Clavicular- Deferred. Axillary- Deferred. Inguinal- Deferred.    Cardiovascular: There are no carotid bruits. Heart- Normal Rate with Regular rhythm and no murmurs. There are no gallops. There are no rubs. In the lower extremities there is no edema. The upper extremities do not have edema.    Abdomen: Soft, benign, non-tender with no masses, hernias, organomegaly or scars.    Breast: Normal contours bilaterally with no masses, discharge, skin changes or lumps. There are no scars noted.    Dermatologic: The patient has no worrisome or suspicious skin lesions noted.    Impression and Assessment    Normal Physical Examination.    Encounter for Immunization Administration.    Plan    Counseling was provided regarding: Adequate Aerobic Exercise, Dental Visits, Flossing Teeth, Heart Healthy Diet, Seat Belt Utilization and Weight Lose.    The following was ordered for screening and health maintenance: CBC w Automated Diff, CMP, Lipid Profile, TSH and U/A.    Counseled regarding immunizations and applicable VIS given.    Immunizations Ordered and Administered: Tdap.    Diagnoses and all orders for this visit:    1. Physical exam (Primary)  -     CBC & Differential; Future  -     Comprehensive Metabolic Panel; Future  -     TSH; Future  -     POC Urinalysis Dipstick, Automated; Future    2. Immunization due  -     Tdap Vaccine Greater Than or Equal To 6yo IM    3. Screening for cardiovascular condition  -     Lipid Panel; Future        Return to Office    The patient was instructed to return for follow-up in 1 year. The patient was  instructed to return sooner if the condition changes, worsens, or does not resolve.

## 2024-07-23 LAB — TSH SERPL DL<=0.05 MIU/L-ACNC: 0.84 UIU/ML (ref 0.27–4.2)

## 2024-10-31 RX ORDER — SPIRONOLACTONE 25 MG/1
75 TABLET ORAL DAILY
Qty: 30 TABLET | Refills: 5 | Status: SHIPPED | OUTPATIENT
Start: 2024-10-31

## 2024-11-04 RX ORDER — ZOLPIDEM TARTRATE 5 MG/1
5 TABLET ORAL NIGHTLY PRN
Qty: 30 TABLET | Refills: 2 | Status: SHIPPED | OUTPATIENT
Start: 2024-11-04

## 2025-01-14 ENCOUNTER — OFFICE VISIT (OUTPATIENT)
Dept: OBSTETRICS AND GYNECOLOGY | Facility: CLINIC | Age: 50
End: 2025-01-14
Payer: COMMERCIAL

## 2025-01-14 VITALS — DIASTOLIC BLOOD PRESSURE: 78 MMHG | BODY MASS INDEX: 25.38 KG/M2 | WEIGHT: 149 LBS | SYSTOLIC BLOOD PRESSURE: 120 MMHG

## 2025-01-14 DIAGNOSIS — Z01.419 WOMEN'S ANNUAL ROUTINE GYNECOLOGICAL EXAMINATION: Primary | ICD-10-CM

## 2025-01-14 DIAGNOSIS — Z98.890 STATUS POST ENDOMETRIAL ABLATION: ICD-10-CM

## 2025-01-14 DIAGNOSIS — Z12.39 ENCOUNTER FOR BREAST CANCER SCREENING USING NON-MAMMOGRAM MODALITY: ICD-10-CM

## 2025-01-14 RX ORDER — VALACYCLOVIR HYDROCHLORIDE 1 G/1
1000 TABLET, FILM COATED ORAL DAILY
Qty: 30 TABLET | Refills: 1 | Status: SHIPPED | OUTPATIENT
Start: 2025-01-14

## 2025-02-17 RX ORDER — SPIRONOLACTONE 25 MG/1
75 TABLET ORAL DAILY
Qty: 60 TABLET | Refills: 5 | Status: SHIPPED | OUTPATIENT
Start: 2025-02-17

## 2025-03-03 RX ORDER — ZOLPIDEM TARTRATE 5 MG/1
5 TABLET ORAL NIGHTLY PRN
Qty: 30 TABLET | Refills: 2 | Status: SHIPPED | OUTPATIENT
Start: 2025-03-03

## 2025-03-10 RX ORDER — FLUOXETINE 10 MG/1
CAPSULE ORAL
Qty: 135 CAPSULE | Refills: 1 | Status: SHIPPED | OUTPATIENT
Start: 2025-03-10

## 2025-03-10 NOTE — TELEPHONE ENCOUNTER
Caller: Rosa Isela Nolan    Relationship: Self    Best call back number: [phone]     Requested Prescriptions:   Requested Prescriptions     Pending Prescriptions Disp Refills    FLUoxetine (PROzac) 10 MG capsule 135 capsule 1     Sig: TAKE 1 CAPSULE BY MOUTH EVERY DAY AND TAKE 2 CAPSULES ON PREMENSES        Pharmacy where request should be sent: [facility] [id number] [address] - [phone] [facility] [phone] FX     Last office visit with prescribing clinician: 7/22/2024   Last telemedicine visit with prescribing clinician: Visit date not found   Next office visit with prescribing clinician: Visit date not found     Additional details provided by patient: PATIENT STATES SHE HAD A BIG SUPPLY OF THIS MEDICATION, SO HASN'T HAD TO HAVE IT REFILLED IN QUITE SOME TIME. SHE WOULD LIKE DR. RUIZ TO CALL THIS IN AGAIN FOR HER. SHE WOULD LIKE TO HAVE A 90 DAY SUPPLY IF POSSIBLE. PLEASE CALL IF SHE NEEDS AN APPOINTMENT FOR THE MEDICATION TO BE REFILLED.     Does the patient have less than a 3 day supply:  [x] Yes  [] No    Would you like a call back once the refill request has been completed: [] Yes [x] No    If the office needs to give you a call back, can they leave a voicemail: [] Yes [x] No    John Jones Rep   03/10/25 10:50 EDT

## 2025-06-05 LAB
NCCN CRITERIA FLAG: NORMAL
TYRER CUZICK SCORE: 8

## 2025-06-06 ENCOUNTER — HOSPITAL ENCOUNTER (OUTPATIENT)
Dept: MAMMOGRAPHY | Facility: HOSPITAL | Age: 50
Discharge: HOME | End: 2025-06-06
Admitting: OBSTETRICS & GYNECOLOGY
Payer: COMMERCIAL

## 2025-06-06 DIAGNOSIS — Z12.39 ENCOUNTER FOR BREAST CANCER SCREENING USING NON-MAMMOGRAM MODALITY: ICD-10-CM

## 2025-06-06 PROCEDURE — 77067 SCR MAMMO BI INCL CAD: CPT

## 2025-06-06 PROCEDURE — 77063 BREAST TOMOSYNTHESIS BI: CPT

## 2025-06-24 RX ORDER — ZOLPIDEM TARTRATE 5 MG/1
5 TABLET ORAL NIGHTLY PRN
Qty: 30 TABLET | Refills: 0 | Status: SHIPPED | OUTPATIENT
Start: 2025-06-24

## 2025-06-24 NOTE — TELEPHONE ENCOUNTER
PDMP reviewed. Refill provided.  Patient will need to fill out CSA and provide UDS at next visit    Dr. Colmenares

## 2025-07-14 RX ORDER — SPIRONOLACTONE 25 MG/1
75 TABLET ORAL DAILY
Qty: 270 TABLET | Refills: 1 | Status: SHIPPED | OUTPATIENT
Start: 2025-07-14

## 2025-08-18 ENCOUNTER — OFFICE VISIT (OUTPATIENT)
Dept: INTERNAL MEDICINE | Facility: CLINIC | Age: 50
End: 2025-08-18
Payer: COMMERCIAL

## 2025-08-18 VITALS
DIASTOLIC BLOOD PRESSURE: 78 MMHG | WEIGHT: 151.6 LBS | HEART RATE: 72 BPM | OXYGEN SATURATION: 97 % | SYSTOLIC BLOOD PRESSURE: 118 MMHG | BODY MASS INDEX: 25.88 KG/M2 | HEIGHT: 64 IN | TEMPERATURE: 97.5 F

## 2025-08-18 DIAGNOSIS — Z13.1 ENCOUNTER FOR SCREENING EXAMINATION FOR IMPAIRED GLUCOSE REGULATION AND DIABETES MELLITUS: ICD-10-CM

## 2025-08-18 DIAGNOSIS — Z13.220 LIPID SCREENING: ICD-10-CM

## 2025-08-18 DIAGNOSIS — Z00.00 ANNUAL PHYSICAL EXAM: Primary | ICD-10-CM

## 2025-08-18 DIAGNOSIS — Z79.899 HIGH RISK MEDICATION USE: ICD-10-CM

## 2025-08-18 DIAGNOSIS — F51.01 PRIMARY INSOMNIA: ICD-10-CM

## 2025-08-18 LAB
ALBUMIN SERPL-MCNC: 4.4 G/DL (ref 3.5–5.2)
ALBUMIN/GLOB SERPL: 1.3 G/DL
ALP SERPL-CCNC: 48 U/L (ref 39–117)
ALT SERPL W P-5'-P-CCNC: 16 U/L (ref 1–33)
ANION GAP SERPL CALCULATED.3IONS-SCNC: 13.1 MMOL/L (ref 5–15)
AST SERPL-CCNC: 23 U/L (ref 1–32)
BASOPHILS # BLD AUTO: 0.05 10*3/MM3 (ref 0–0.2)
BASOPHILS NFR BLD AUTO: 0.8 % (ref 0–1.5)
BILIRUB BLD-MCNC: NEGATIVE MG/DL
BILIRUB SERPL-MCNC: 1 MG/DL (ref 0–1.2)
BUN SERPL-MCNC: 13 MG/DL (ref 6–20)
BUN/CREAT SERPL: 14.1 (ref 7–25)
CALCIUM SPEC-SCNC: 9.7 MG/DL (ref 8.6–10.5)
CHLORIDE SERPL-SCNC: 101 MMOL/L (ref 98–107)
CHOLEST SERPL-MCNC: 253 MG/DL (ref 0–200)
CLARITY, POC: CLEAR
CO2 SERPL-SCNC: 23.9 MMOL/L (ref 22–29)
COLOR UR: YELLOW
CREAT SERPL-MCNC: 0.92 MG/DL (ref 0.57–1)
DEPRECATED RDW RBC AUTO: 42.4 FL (ref 37–54)
EGFRCR SERPLBLD CKD-EPI 2021: 76 ML/MIN/1.73
EOSINOPHIL # BLD AUTO: 0.15 10*3/MM3 (ref 0–0.4)
EOSINOPHIL NFR BLD AUTO: 2.3 % (ref 0.3–6.2)
ERYTHROCYTE [DISTWIDTH] IN BLOOD BY AUTOMATED COUNT: 12.6 % (ref 12.3–15.4)
EXPIRATION DATE: NORMAL
GLOBULIN UR ELPH-MCNC: 3.3 GM/DL
GLUCOSE SERPL-MCNC: 86 MG/DL (ref 65–99)
GLUCOSE UR STRIP-MCNC: NEGATIVE MG/DL
HCT VFR BLD AUTO: 41.4 % (ref 34–46.6)
HDLC SERPL-MCNC: 74 MG/DL (ref 40–60)
HGB BLD-MCNC: 13.9 G/DL (ref 12–15.9)
IMM GRANULOCYTES # BLD AUTO: 0.04 10*3/MM3 (ref 0–0.05)
IMM GRANULOCYTES NFR BLD AUTO: 0.6 % (ref 0–0.5)
KETONES UR QL: NEGATIVE
LDLC SERPL CALC-MCNC: 147 MG/DL (ref 0–100)
LDLC/HDLC SERPL: 1.93 {RATIO}
LEUKOCYTE EST, POC: NEGATIVE
LYMPHOCYTES # BLD AUTO: 1.98 10*3/MM3 (ref 0.7–3.1)
LYMPHOCYTES NFR BLD AUTO: 30.6 % (ref 19.6–45.3)
Lab: NORMAL
MCH RBC QN AUTO: 31.4 PG (ref 26.6–33)
MCHC RBC AUTO-ENTMCNC: 33.6 G/DL (ref 31.5–35.7)
MCV RBC AUTO: 93.7 FL (ref 79–97)
MONOCYTES # BLD AUTO: 0.63 10*3/MM3 (ref 0.1–0.9)
MONOCYTES NFR BLD AUTO: 9.7 % (ref 5–12)
NEUTROPHILS NFR BLD AUTO: 3.63 10*3/MM3 (ref 1.7–7)
NEUTROPHILS NFR BLD AUTO: 56 % (ref 42.7–76)
NITRITE UR-MCNC: NEGATIVE MG/ML
NRBC BLD AUTO-RTO: 0 /100 WBC (ref 0–0.2)
PH UR: 6 [PH] (ref 5–8)
PLATELET # BLD AUTO: 226 10*3/MM3 (ref 140–450)
PMV BLD AUTO: 11 FL (ref 6–12)
POTASSIUM SERPL-SCNC: 4.4 MMOL/L (ref 3.5–5.2)
PROT SERPL-MCNC: 7.7 G/DL (ref 6–8.5)
PROT UR STRIP-MCNC: NEGATIVE MG/DL
RBC # BLD AUTO: 4.42 10*6/MM3 (ref 3.77–5.28)
RBC # UR STRIP: NEGATIVE /UL
SODIUM SERPL-SCNC: 138 MMOL/L (ref 136–145)
SP GR UR: 1.01 (ref 1–1.03)
TRIGL SERPL-MCNC: 180 MG/DL (ref 0–150)
UROBILINOGEN UR QL: NORMAL
VLDLC SERPL-MCNC: 32 MG/DL (ref 5–40)
WBC NRBC COR # BLD AUTO: 6.48 10*3/MM3 (ref 3.4–10.8)

## 2025-08-18 PROCEDURE — 85025 COMPLETE CBC W/AUTO DIFF WBC: CPT | Performed by: NURSE PRACTITIONER

## 2025-08-18 PROCEDURE — 80053 COMPREHEN METABOLIC PANEL: CPT | Performed by: NURSE PRACTITIONER

## 2025-08-18 PROCEDURE — 80061 LIPID PANEL: CPT | Performed by: NURSE PRACTITIONER

## 2025-08-18 PROCEDURE — 99396 PREV VISIT EST AGE 40-64: CPT | Performed by: NURSE PRACTITIONER

## 2025-08-18 PROCEDURE — 36415 COLL VENOUS BLD VENIPUNCTURE: CPT | Performed by: NURSE PRACTITIONER

## 2025-08-18 PROCEDURE — 81003 URINALYSIS AUTO W/O SCOPE: CPT | Performed by: NURSE PRACTITIONER

## 2025-08-19 ENCOUNTER — RESULTS FOLLOW-UP (OUTPATIENT)
Dept: INTERNAL MEDICINE | Facility: CLINIC | Age: 50
End: 2025-08-19
Payer: COMMERCIAL

## 2025-08-21 DIAGNOSIS — F51.01 PRIMARY INSOMNIA: Primary | ICD-10-CM

## 2025-08-22 LAB
1OH-MIDAZOLAM UR QL SCN: NOT DETECTED NG/MG CREAT
6MAM UR QL SCN: NEGATIVE NG/ML
7AMINOCLONAZEPAM/CREAT UR: NOT DETECTED NG/MG CREAT
A-OH ALPRAZ/CREAT UR: NOT DETECTED NG/MG CREAT
A-OH-TRIAZOLAM/CREAT UR CFM: NOT DETECTED NG/MG CREAT
ACP UR QL CFM: NOT DETECTED
ALPRAZ/CREAT UR CFM: NOT DETECTED NG/MG CREAT
AMPHETAMINES UR QL SCN: NEGATIVE NG/ML
APAP UR QL SCN: NEGATIVE UG/ML
BARBITURATES UR QL SCN: NEGATIVE NG/ML
BENZODIAZ SCN METH UR: NEGATIVE
BUPRENORPHINE UR QL SCN: NEGATIVE
BUPRENORPHINE/CREAT UR: NOT DETECTED NG/MG CREAT
CANNABINOIDS UR QL SCN: NEGATIVE NG/ML
CARISOPRODOL UR QL: NEGATIVE NG/ML
CLONAZEPAM/CREAT UR CFM: NOT DETECTED NG/MG CREAT
COCAINE+BZE UR QL SCN: NEGATIVE NG/ML
CREAT UR-MCNC: 46 MG/DL
D-METHORPHAN UR-MCNC: NOT DETECTED NG/ML
D-METHORPHAN+LEVORPHANOL UR QL: NOT DETECTED
DESALKYLFLURAZ/CREAT UR: NOT DETECTED NG/MG CREAT
DIAZEPAM/CREAT UR: NOT DETECTED NG/MG CREAT
ETG ETS UR QL CFM: NEGATIVE
ETHANOL UR QL SCN: NEGATIVE G/DL
ETHANOL UR QL SCN: NORMAL NG/ML
ETHYL GLUCURONIDE UR CFM-MCNC: NOT DETECTED NG/MG CREAT
ETHYL SULFATE UR CFM-MCNC: NOT DETECTED NG/MG CREAT
FENTANYL CTO UR SCN-MCNC: NEGATIVE NG/ML
FENTANYL/CREAT UR: NOT DETECTED NG/MG CREAT
FLUNITRAZEPAM UR QL SCN: NOT DETECTED NG/MG CREAT
GABAPENTIN UR-MCNC: NEGATIVE UG/ML
HALLUCINOGENS UR: NEGATIVE
HYPNOTICS UR QL SCN: NEGATIVE
KETAMINE UR QL: NOT DETECTED
LORAZEPAM/CREAT UR: NOT DETECTED NG/MG CREAT
MEPERIDINE UR QL SCN: NEGATIVE NG/ML
METHADONE UR QL SCN: NEGATIVE NG/ML
METHADONE+METAB UR QL SCN: NEGATIVE NG/ML
MIDAZOLAM/CREAT UR CFM: NOT DETECTED NG/MG CREAT
MISCELLANEOUS, UR: NEGATIVE
NORBUPRENORPHINE/CREAT UR: NOT DETECTED NG/MG CREAT
NORDIAZEPAM/CREAT UR: NOT DETECTED NG/MG CREAT
NORFENTANYL/CREAT UR: NOT DETECTED NG/MG CREAT
NORFLUNITRAZEPAM UR-MCNC: NOT DETECTED NG/MG CREAT
NORKETAMINE UR-MCNC: NOT DETECTED UG/ML
OPIATES UR SCN-MCNC: NEGATIVE NG/ML
OXAZEPAM/CREAT UR: NOT DETECTED NG/MG CREAT
OXYCODONE CTO UR SCN-MCNC: NEGATIVE NG/ML
PCP UR QL SCN: NEGATIVE NG/ML
PRESCRIBED MEDICATIONS: NORMAL
PROPOXYPH UR QL SCN: NEGATIVE NG/ML
TAPENTADOL CTO UR SCN-MCNC: NEGATIVE NG/ML
TEMAZEPAM/CREAT UR: NOT DETECTED NG/MG CREAT
TRAMADOL UR QL SCN: NEGATIVE NG/ML
ZALEPLON UR-MCNC: NOT DETECTED NG/ML
ZOLPIDEM PHENYL-4-CARB UR QL SCN: NOT DETECTED
ZOLPIDEM UR QL SCN: NOT DETECTED
ZOPICLONE-N-OXIDE UR-MCNC: NOT DETECTED NG/ML

## 2025-08-22 RX ORDER — ZOLPIDEM TARTRATE 5 MG/1
5 TABLET ORAL NIGHTLY PRN
Qty: 30 TABLET | Refills: 2 | Status: SHIPPED | OUTPATIENT
Start: 2025-08-22

## 2025-08-26 ENCOUNTER — TELEPHONE (OUTPATIENT)
Dept: OBSTETRICS AND GYNECOLOGY | Facility: CLINIC | Age: 50
End: 2025-08-26
Payer: COMMERCIAL